# Patient Record
Sex: FEMALE | Race: WHITE
[De-identification: names, ages, dates, MRNs, and addresses within clinical notes are randomized per-mention and may not be internally consistent; named-entity substitution may affect disease eponyms.]

---

## 2020-12-14 ENCOUNTER — NON-APPOINTMENT (OUTPATIENT)
Age: 65
End: 2020-12-14

## 2020-12-14 ENCOUNTER — APPOINTMENT (OUTPATIENT)
Dept: OPHTHALMOLOGY | Facility: CLINIC | Age: 65
End: 2020-12-14
Payer: COMMERCIAL

## 2020-12-14 PROCEDURE — 92025 CPTRIZED CORNEAL TOPOGRAPHY: CPT

## 2020-12-14 PROCEDURE — 92136 OPHTHALMIC BIOMETRY: CPT

## 2020-12-14 PROCEDURE — 92250 FUNDUS PHOTOGRAPHY W/I&R: CPT

## 2020-12-14 PROCEDURE — 99072 ADDL SUPL MATRL&STAF TM PHE: CPT

## 2020-12-14 PROCEDURE — 92004 COMPRE OPH EXAM NEW PT 1/>: CPT

## 2020-12-16 ENCOUNTER — TRANSCRIPTION ENCOUNTER (OUTPATIENT)
Age: 65
End: 2020-12-16

## 2021-06-14 ENCOUNTER — APPOINTMENT (OUTPATIENT)
Dept: OPHTHALMOLOGY | Facility: CLINIC | Age: 66
End: 2021-06-14
Payer: MEDICARE

## 2021-06-14 ENCOUNTER — NON-APPOINTMENT (OUTPATIENT)
Age: 66
End: 2021-06-14

## 2021-06-14 PROCEDURE — 92012 INTRM OPH EXAM EST PATIENT: CPT

## 2021-10-06 ENCOUNTER — APPOINTMENT (OUTPATIENT)
Dept: OPHTHALMOLOGY | Facility: CLINIC | Age: 66
End: 2021-10-06

## 2021-12-06 ENCOUNTER — TRANSCRIPTION ENCOUNTER (OUTPATIENT)
Age: 66
End: 2021-12-06

## 2022-02-19 ENCOUNTER — TRANSCRIPTION ENCOUNTER (OUTPATIENT)
Age: 67
End: 2022-02-19

## 2022-03-07 NOTE — ASU PATIENT PROFILE, ADULT - NSICDXPASTSURGICALHX_GEN_ALL_CORE_FT
PAST SURGICAL HISTORY:  Fracture of right wrist     History of facial surgery     History of repair of left rotator cuff     S/P bilateral breast reduction     S/P gastric bypass     S/P ovarian cystectomy     Status post abdominoplasty     Status post tonsillectomy

## 2022-03-07 NOTE — ASU PATIENT PROFILE, ADULT - ABILITY TO HEAR (WITH HEARING AID OR HEARING APPLIANCE IF NORMALLY USED):
Right ear/Mildly to Moderately Impaired: difficulty hearing in some environments or speaker may need to increase volume or speak distinctly

## 2022-03-07 NOTE — ASU PATIENT PROFILE, ADULT - FALL HARM RISK - UNIVERSAL INTERVENTIONS
Bed in lowest position, wheels locked, appropriate side rails in place/Call bell, personal items and telephone in reach/Instruct patient to call for assistance before getting out of bed or chair/Non-slip footwear when patient is out of bed/[place] to call system/Physically safe environment - no spills, clutter or unnecessary equipment/Purposeful Proactive Rounding/Room/bathroom lighting operational, light cord in reach

## 2022-03-08 ENCOUNTER — NON-APPOINTMENT (OUTPATIENT)
Age: 67
End: 2022-03-08

## 2022-03-08 ENCOUNTER — OUTPATIENT (OUTPATIENT)
Dept: OUTPATIENT SERVICES | Facility: HOSPITAL | Age: 67
LOS: 1 days | Discharge: HOME | End: 2022-03-08
Payer: MEDICARE

## 2022-03-08 ENCOUNTER — APPOINTMENT (OUTPATIENT)
Dept: OPHTHALMOLOGY | Facility: AMBULATORY SURGERY CENTER | Age: 67
End: 2022-03-08

## 2022-03-08 VITALS
DIASTOLIC BLOOD PRESSURE: 49 MMHG | WEIGHT: 155.87 LBS | HEART RATE: 68 BPM | RESPIRATION RATE: 16 BRPM | TEMPERATURE: 98 F | SYSTOLIC BLOOD PRESSURE: 101 MMHG | HEIGHT: 55 IN | OXYGEN SATURATION: 98 %

## 2022-03-08 VITALS
HEART RATE: 84 BPM | DIASTOLIC BLOOD PRESSURE: 58 MMHG | TEMPERATURE: 98 F | SYSTOLIC BLOOD PRESSURE: 112 MMHG | RESPIRATION RATE: 18 BRPM | OXYGEN SATURATION: 98 %

## 2022-03-08 DIAGNOSIS — Z98.890 OTHER SPECIFIED POSTPROCEDURAL STATES: Chronic | ICD-10-CM

## 2022-03-08 DIAGNOSIS — Z90.89 ACQUIRED ABSENCE OF OTHER ORGANS: Chronic | ICD-10-CM

## 2022-03-08 DIAGNOSIS — S62.101A FRACTURE OF UNSPECIFIED CARPAL BONE, RIGHT WRIST, INITIAL ENCOUNTER FOR CLOSED FRACTURE: Chronic | ICD-10-CM

## 2022-03-08 DIAGNOSIS — Z98.84 BARIATRIC SURGERY STATUS: Chronic | ICD-10-CM

## 2022-03-08 PROCEDURE — 6698F: CPT | Mod: RT

## 2022-03-08 PROCEDURE — V2787: CPT

## 2022-03-08 PROCEDURE — 66984 XCAPSL CTRC RMVL W/O ECP: CPT | Mod: RT

## 2022-03-08 DEVICE — IMPLANTABLE DEVICE
Type: IMPLANTABLE DEVICE | Site: RIGHT | Status: NON-FUNCTIONAL
Removed: 2022-03-08

## 2022-03-08 RX ORDER — SODIUM CHLORIDE 9 MG/ML
1000 INJECTION, SOLUTION INTRAVENOUS
Refills: 0 | Status: DISCONTINUED | OUTPATIENT
Start: 2022-03-08 | End: 2022-03-08

## 2022-03-08 RX ORDER — PHENYLEPHRINE HCL 2.5 %
1 DROPS OPHTHALMIC (EYE)
Refills: 0 | Status: COMPLETED | OUTPATIENT
Start: 2022-03-08 | End: 2022-03-08

## 2022-03-08 RX ORDER — ACETAMINOPHEN 500 MG
2 TABLET ORAL
Qty: 0 | Refills: 0 | DISCHARGE
Start: 2022-03-08

## 2022-03-08 RX ORDER — ACETAMINOPHEN 500 MG
650 TABLET ORAL ONCE
Refills: 0 | Status: DISCONTINUED | OUTPATIENT
Start: 2022-03-08 | End: 2022-03-08

## 2022-03-08 RX ORDER — ONDANSETRON 8 MG/1
4 TABLET, FILM COATED ORAL ONCE
Refills: 0 | Status: DISCONTINUED | OUTPATIENT
Start: 2022-03-08 | End: 2022-03-08

## 2022-03-08 RX ORDER — OFLOXACIN 0.3 %
1 DROPS OPHTHALMIC (EYE)
Refills: 0 | Status: COMPLETED | OUTPATIENT
Start: 2022-03-08 | End: 2022-03-08

## 2022-03-08 RX ORDER — TROPICAMIDE 1 %
1 DROPS OPHTHALMIC (EYE)
Refills: 0 | Status: COMPLETED | OUTPATIENT
Start: 2022-03-08 | End: 2022-03-08

## 2022-03-08 RX ADMIN — Medication 1 DROP(S): at 07:11

## 2022-03-08 RX ADMIN — Medication 1 DROP(S): at 07:02

## 2022-03-08 RX ADMIN — Medication 1 DROP(S): at 07:18

## 2022-03-08 RX ADMIN — Medication 1 DROP(S): at 07:01

## 2022-03-08 RX ADMIN — Medication 1 DROP(S): at 07:12

## 2022-03-08 NOTE — OPERATIVE REPORT - OPERATIVE RPOSRT DETAILS
PATIENT: RACHAEL DAS	    ACCOUNT  NUMBER:  [id number]     OPERATING SURGEON:  Dr. Jeferson Zamora	    ASSISTANT SURGEON:  Dr. Arina Simpson    DATE OF SURGERY:  3/8/22    ANESTHESIA:  MAC/TOPICAL	    ESTIMATED BLOOD LOSS: < 1mL	    COMPLICATIONS: None	    PREOPERATIVE DIAGNOSIS:  Cataract,  right eye    POSTOPERATIVE DIAGNOSIS:  Cataract, right  eye    OPERATIVE PROCEDURE:  1. Femtosecond laser assisted laser surgery      right eye.  2. Phacoemulsification with insertion of toric posterior chamber intraocular lens with intraoperative ORA  right eye    LENS IMPLANT: SN6AT3 +15.5    PROCEDURE:	The patient was brought to the laser room where certain aspects of the procedure were performed with the femtosecond laser.     The patient was then brought into the operating room and placed supine on the operating room table. After uneventful induction of neuroleptic anesthesia, lidocaine gel was instilled into the operative eye achieving sufficient anesthesia.  The patient was then prepped and draped in the usual sterile fashion.  A wire lid speculum was then inserted into the operative eye giving a wide palpebral fissure.      	A emmanuel knife was used to perform paracenteses through clear cornea at the 12 and 6 o’clock limbal positions.  The anterior chamber was irrigated with lidocaine 1% nonpreserved followed by epinephrine 0.1% nonpreserved.  Viscoelastic was then used to maintain the anterior chamber.  A keratome was used to create a clear corneal incision just inside the temporal limbus.  Capsulorhexis forceps were used to complete the capsulorhexis. Balanced salt solution was used to hydrodissect the nucleus.  The [employer] phacoemulsification unit was then used to completely phacoemulsify the nucleus, following which an aspirating handpiece was used to aspirate all cortical remnants from the capsular bag.  Viscoelastic was  used to reform the anterior chamber and capsular bag.      	Intraoperateive ORA was used to determine the appropriate lens. A new foldable posterior chamber intraocular lens was brought into the surgical field.  It was folded and directly injected into the capsular bag following which it was centered and secure.  All viscoelastic was then aspirated from the anterior segment using an aspirating handpiece.  All wounds were checked for leaks and there were none.  A solution consisting of 4 mg dexamethasone, 100 mg cefazolin was used to irrigate the surface of the eye.  The lid speculum was removed from the eye and a shield placed over the eye.      	The patient tolerated the procedure well and went to the recovery area in good condition.       PATIENT: RACHAEL DAS	    ACCOUNT  NUMBER:  [id number]     OPERATING SURGEON:  Dr. Jeferson Zamora	    ASSISTANT SURGEON:  Dr. Arina Simpson    DATE OF SURGERY:  3/8/22    ANESTHESIA:  MAC/TOPICAL	    ESTIMATED BLOOD LOSS: < 1mL	    COMPLICATIONS: None	    PREOPERATIVE DIAGNOSIS:  Cataract,  right eye    POSTOPERATIVE DIAGNOSIS:  Cataract, right  eye    OPERATIVE PROCEDURE:  1. Femtosecond laser assisted laser surgery      right eye.  2. Phacoemulsification with insertion of toric posterior chamber intraocular lens with intraoperative ORA  right eye    LENS IMPLANT: SN6AT3 +15.5    PROCEDURE:	The patient was brought to the laser room where certain aspects of the procedure were performed with the femtosecond laser.     The patient was then brought into the operating room and placed supine on the operating room table. After uneventful induction of neuroleptic anesthesia, lidocaine gel was instilled into the operative eye achieving sufficient anesthesia.  The patient was then prepped and draped in the usual sterile fashion.  A wire lid speculum was then inserted into the operative eye giving a wide palpebral fissure.      	A emmanuel knife was used to perform paracenteses through clear cornea at the 12 and 6 o’clock limbal positions.  The anterior chamber was irrigated with lidocaine 1% nonpreserved followed by epinephrine 0.1% nonpreserved.  Viscoelastic was then used to maintain the anterior chamber.  A keratome was used to create a clear corneal incision just inside the temporal limbus.  Capsulorhexis forceps were used to complete the capsulorhexis. Balanced salt solution was used to hydrodissect the nucleus.  The [employer] phacoemulsification unit was then used to completely phacoemulsify the nucleus, following which an aspirating handpiece was used to aspirate all cortical remnants from the capsular bag.  Viscoelastic was  used to reform the anterior chamber and capsular bag.      	Intraoperateive ORA was used to determine the appropriate lens. A new foldable posterior chamber intraocular lens was brought into the surgical field.  It was folded and directly injected into the capsular bag following which ORA was again performed to determine appropriate IOL orientation.  All viscoelastic was then aspirated from the anterior segment using an aspirating handpiece.  All wounds were checked for leaks and there were none.  A solution consisting of 4 mg dexamethasone, 100 mg cefazolin was used to irrigate the surface of the eye.  The lid speculum was removed from the eye and a shield placed over the eye.      	The patient tolerated the procedure well and went to the recovery area in good condition.

## 2022-03-08 NOTE — ASU DISCHARGE PLAN (ADULT/PEDIATRIC) - NS MD DC FALL RISK RISK
For information on Fall & Injury Prevention, visit: [email or web address] OR  [email or web address] OR  https://www.cdc.gov/steadi/patient.html

## 2022-03-08 NOTE — ASU DISCHARGE PLAN (ADULT/PEDIATRIC) - CALL YOUR DOCTOR IF YOU HAVE ANY OF THE FOLLOWING:
Swelling that gets worse/Pain not relieved by Medications/Fever greater than (need to indicate Fahrenheit or Celsius)/Wound/Surgical Site with redness, or foul smelling discharge or pus/Nausea and vomiting that does not stop

## 2022-03-09 ENCOUNTER — NON-APPOINTMENT (OUTPATIENT)
Age: 67
End: 2022-03-09

## 2022-03-09 ENCOUNTER — APPOINTMENT (OUTPATIENT)
Dept: OPHTHALMOLOGY | Facility: CLINIC | Age: 67
End: 2022-03-09
Payer: MEDICARE

## 2022-03-09 PROBLEM — E78.00 PURE HYPERCHOLESTEROLEMIA, UNSPECIFIED: Chronic | Status: ACTIVE | Noted: 2022-03-07

## 2022-03-09 PROCEDURE — 99024 POSTOP FOLLOW-UP VISIT: CPT

## 2022-03-16 ENCOUNTER — APPOINTMENT (OUTPATIENT)
Dept: OPHTHALMOLOGY | Facility: CLINIC | Age: 67
End: 2022-03-16
Payer: MEDICARE

## 2022-03-16 ENCOUNTER — NON-APPOINTMENT (OUTPATIENT)
Age: 67
End: 2022-03-16

## 2022-03-16 PROCEDURE — 99024 POSTOP FOLLOW-UP VISIT: CPT

## 2022-04-11 NOTE — ASU PATIENT PROFILE, ADULT - NSICDXPASTSURGICALHX_GEN_ALL_CORE_FT
PAST SURGICAL HISTORY:  Fracture of right wrist     History of facial surgery     History of repair of left rotator cuff     S/P bilateral breast reduction     S/P cataract extraction, right     S/P gastric bypass     S/P ovarian cystectomy     Status post abdominoplasty     Status post tonsillectomy

## 2022-04-12 ENCOUNTER — OUTPATIENT (OUTPATIENT)
Dept: OUTPATIENT SERVICES | Facility: HOSPITAL | Age: 67
LOS: 1 days | Discharge: HOME | End: 2022-04-12
Payer: MEDICARE

## 2022-04-12 ENCOUNTER — APPOINTMENT (OUTPATIENT)
Dept: OPHTHALMOLOGY | Facility: AMBULATORY SURGERY CENTER | Age: 67
End: 2022-04-12

## 2022-04-12 ENCOUNTER — NON-APPOINTMENT (OUTPATIENT)
Age: 67
End: 2022-04-12

## 2022-04-12 ENCOUNTER — TRANSCRIPTION ENCOUNTER (OUTPATIENT)
Age: 67
End: 2022-04-12

## 2022-04-12 VITALS
DIASTOLIC BLOOD PRESSURE: 53 MMHG | HEART RATE: 78 BPM | OXYGEN SATURATION: 98 % | SYSTOLIC BLOOD PRESSURE: 104 MMHG | WEIGHT: 153.66 LBS | RESPIRATION RATE: 16 BRPM | TEMPERATURE: 99 F | HEIGHT: 68 IN

## 2022-04-12 VITALS
SYSTOLIC BLOOD PRESSURE: 102 MMHG | DIASTOLIC BLOOD PRESSURE: 54 MMHG | TEMPERATURE: 98 F | HEART RATE: 76 BPM | OXYGEN SATURATION: 97 % | RESPIRATION RATE: 16 BRPM

## 2022-04-12 DIAGNOSIS — Z90.89 ACQUIRED ABSENCE OF OTHER ORGANS: Chronic | ICD-10-CM

## 2022-04-12 DIAGNOSIS — Z98.890 OTHER SPECIFIED POSTPROCEDURAL STATES: Chronic | ICD-10-CM

## 2022-04-12 DIAGNOSIS — Z98.84 BARIATRIC SURGERY STATUS: Chronic | ICD-10-CM

## 2022-04-12 DIAGNOSIS — S62.101A FRACTURE OF UNSPECIFIED CARPAL BONE, RIGHT WRIST, INITIAL ENCOUNTER FOR CLOSED FRACTURE: Chronic | ICD-10-CM

## 2022-04-12 DIAGNOSIS — Z98.41 CATARACT EXTRACTION STATUS, RIGHT EYE: Chronic | ICD-10-CM

## 2022-04-12 PROCEDURE — 6698F: CPT | Mod: LT

## 2022-04-12 PROCEDURE — V2787: CPT

## 2022-04-12 PROCEDURE — 66984 XCAPSL CTRC RMVL W/O ECP: CPT | Mod: LT,79

## 2022-04-12 DEVICE — IMPLANTABLE DEVICE
Type: IMPLANTABLE DEVICE | Site: LEFT | Status: NON-FUNCTIONAL
Removed: 2022-04-12

## 2022-04-12 RX ORDER — FAMOTIDINE 10 MG/ML
1 INJECTION INTRAVENOUS
Qty: 0 | Refills: 0 | DISCHARGE

## 2022-04-12 RX ORDER — DIVALPROEX SODIUM 500 MG/1
0 TABLET, DELAYED RELEASE ORAL
Qty: 0 | Refills: 0 | DISCHARGE

## 2022-04-12 RX ORDER — OMEPRAZOLE 10 MG/1
1 CAPSULE, DELAYED RELEASE ORAL
Qty: 0 | Refills: 0 | DISCHARGE

## 2022-04-12 RX ORDER — CYCLOPENTOLATE HYDROCHLORIDE 10 MG/ML
1 SOLUTION/ DROPS OPHTHALMIC
Refills: 0 | Status: DISCONTINUED | OUTPATIENT
Start: 2022-04-12 | End: 2022-04-12

## 2022-04-12 RX ORDER — SERTRALINE 25 MG/1
1 TABLET, FILM COATED ORAL
Qty: 0 | Refills: 0 | DISCHARGE

## 2022-04-12 RX ORDER — OFLOXACIN 0.3 %
1 DROPS OPHTHALMIC (EYE)
Refills: 0 | Status: COMPLETED | OUTPATIENT
Start: 2022-04-12 | End: 2022-04-12

## 2022-04-12 RX ORDER — DIAZEPAM 5 MG
0 TABLET ORAL
Qty: 0 | Refills: 0 | DISCHARGE

## 2022-04-12 RX ORDER — TROPICAMIDE 1 %
1 DROPS OPHTHALMIC (EYE)
Refills: 0 | Status: COMPLETED | OUTPATIENT
Start: 2022-04-12 | End: 2022-04-12

## 2022-04-12 RX ORDER — PHENYLEPHRINE HCL 2.5 %
1 DROPS OPHTHALMIC (EYE)
Refills: 0 | Status: COMPLETED | OUTPATIENT
Start: 2022-04-12 | End: 2022-04-12

## 2022-04-12 RX ORDER — OXYCODONE HYDROCHLORIDE 5 MG/1
5 TABLET ORAL ONCE
Refills: 0 | Status: DISCONTINUED | OUTPATIENT
Start: 2022-04-12 | End: 2022-04-12

## 2022-04-12 RX ORDER — SODIUM CHLORIDE 9 MG/ML
1000 INJECTION, SOLUTION INTRAVENOUS
Refills: 0 | Status: DISCONTINUED | OUTPATIENT
Start: 2022-04-12 | End: 2022-04-12

## 2022-04-12 RX ADMIN — Medication 1 DROP(S): at 08:10

## 2022-04-12 RX ADMIN — Medication 1 DROP(S): at 08:20

## 2022-04-12 RX ADMIN — Medication 1 DROP(S): at 08:15

## 2022-04-12 NOTE — ASU DISCHARGE PLAN (ADULT/PEDIATRIC) - NS MD DC FALL RISK RISK
For information on Fall & Injury Prevention, visit: [email or web address] OR  [email or web address] OR  https://www.cdc.gov/steadi/patient.html

## 2022-04-12 NOTE — OPERATIVE REPORT - OPERATIVE RPOSRT DETAILS
SURGEON: Dr. Jeferson Zamora    ASSISTANT SURGEON:  Dr. Arina Simpson    DATE OF SURGERY: 4/12/22    ANESTHESIA:  MAC/TOPICAL		    COMPLICATIONS:  None	    PREOPERATIVE DIAGNOSIS:  Cataract, Left Eye    POSTOPERATIVE DIAGNOSIS:  Cataract, Left Eye    OPERATIVE PROCEDURE:  1. Femtosecond laser assisted laser surgery        Left Eye  2. Phacoemulsification with insertion of posterior chamber intraocular lens  Left Eye  3. Intraoperative ORA Left Eye    LENS IMPLANT: SN6AT3 +16.0    PROCEDURE:	The patient was brought to the laser room where certain aspects of the procedure were performed with the femtosecond laser.     The patient was then brought into the operating room and placed supine on the operating room table. After uneventful induction of neuroleptic anesthesia, lidocaine gel was instilled into the operative eye achieving sufficient anesthesia.  The patient was then prepped and draped in the usual sterile fashion.  A wire lid speculum was then inserted into the operative eye giving a wide palpebral fissure.      	A emmanuel knife was used to perform paracenteses through clear cornea at the 12 and 6 o’clock limbal positions.  The anterior chamber was irrigated with lidocaine 1% nonpreserved followed by epinephrine 0.1% nonpreserved.  Viscoelastic was then used to maintain the anterior chamber.  A keratome was used to create a clear corneal incision just inside the temporal limbus.  Capsulorhexis forceps were used to complete the capsulorhexis. Balanced salt solution was used to hydrodissect the nucleus.  The [email or web address] phacoemulsification unit was then used to completely phacoemulsify the nucleus, following which an aspirating handpiece was used to aspirate all cortical remnants from the capsular bag.  Viscoelastic was  used to reform the anterior chamber and capsular bag.      	Intraoperative ORA was performed to help determine the appropriate IOL. A new foldable posterior chamber intraocular lens was brought into the surgical field.  It was folded and directly injected into the capsular bag following which ORA was again performed to determine the appropriate orientation of the IOL. All viscoelastic was then aspirated from the anterior segment using an aspirating handpiece.  All wounds were checked for leaks and there were none.  Moxifloxacin 0.3% solution was used to irrigate the surface of the eye.  The lid speculum was removed from the eye and a shield placed over the eye.      	The patient tolerated the procedure well and went to the recovery area in good condition.      					  Jeferson Zamora M.D.

## 2022-04-13 ENCOUNTER — APPOINTMENT (OUTPATIENT)
Dept: OPHTHALMOLOGY | Facility: CLINIC | Age: 67
End: 2022-04-13
Payer: MEDICARE

## 2022-04-13 ENCOUNTER — NON-APPOINTMENT (OUTPATIENT)
Age: 67
End: 2022-04-13

## 2022-04-13 PROCEDURE — 99024 POSTOP FOLLOW-UP VISIT: CPT

## 2022-04-20 ENCOUNTER — APPOINTMENT (OUTPATIENT)
Dept: OPHTHALMOLOGY | Facility: CLINIC | Age: 67
End: 2022-04-20
Payer: MEDICARE

## 2022-04-20 ENCOUNTER — NON-APPOINTMENT (OUTPATIENT)
Age: 67
End: 2022-04-20

## 2022-04-20 PROCEDURE — 99024 POSTOP FOLLOW-UP VISIT: CPT

## 2022-05-26 ENCOUNTER — NON-APPOINTMENT (OUTPATIENT)
Age: 67
End: 2022-05-26

## 2022-05-26 ENCOUNTER — APPOINTMENT (OUTPATIENT)
Dept: OPHTHALMOLOGY | Facility: CLINIC | Age: 67
End: 2022-05-26
Payer: MEDICARE

## 2022-05-26 PROCEDURE — 99024 POSTOP FOLLOW-UP VISIT: CPT

## 2022-07-11 ENCOUNTER — APPOINTMENT (OUTPATIENT)
Dept: OPHTHALMOLOGY | Facility: CLINIC | Age: 67
End: 2022-07-11

## 2022-07-11 ENCOUNTER — NON-APPOINTMENT (OUTPATIENT)
Age: 67
End: 2022-07-11

## 2022-07-11 PROCEDURE — 99024 POSTOP FOLLOW-UP VISIT: CPT

## 2022-08-25 ENCOUNTER — APPOINTMENT (OUTPATIENT)
Dept: ORTHOPEDIC SURGERY | Facility: CLINIC | Age: 67
End: 2022-08-25

## 2022-08-25 PROCEDURE — 20611 DRAIN/INJ JOINT/BURSA W/US: CPT | Mod: 50

## 2022-08-25 PROCEDURE — 99203 OFFICE O/P NEW LOW 30 MIN: CPT | Mod: 25

## 2022-08-25 PROCEDURE — 73562 X-RAY EXAM OF KNEE 3: CPT | Mod: 50

## 2022-08-25 RX ORDER — IBUPROFEN 800 MG/1
800 TABLET ORAL 3 TIMES DAILY
Qty: 90 | Refills: 5 | Status: ACTIVE | COMMUNITY
Start: 2022-08-25 | End: 1900-01-01

## 2022-08-25 RX ORDER — HYALURONATE SODIUM, STABILIZED 88 MG/4 ML
88 SYRINGE (ML) INTRAARTICULAR
Qty: 2 | Refills: 0 | Status: ACTIVE | OUTPATIENT
Start: 2022-08-25

## 2022-08-25 NOTE — HISTORY OF PRESENT ILLNESS
[de-identified] : BILATERAL KNEE PAIN\par STARTED 2 YEARS AGO- NO SPECIFIC INJURY \par INTERMITTENT \par WORSE WHEN GOING UP/DOWN STAIRS, BENDING \par BETTER WITH IBUPROFEN, REST \par PAIN LEVEL: 7/10\par ACHY

## 2022-08-25 NOTE — PHYSICAL EXAM
[de-identified] : PHYSICAL EXAM BILATERAL KNEES\par \par AROM\par RIGHT  0- 140\par LEFT    0-140 \par \par SPECIAL TESTS\par \par PATELLAR GRIND = GRIND \par DRAWER  = NEG\par LACHMAN = NEG\par MACMURRAY = NEG \par \par MOTOR = GROSSLY INTACT\par SENSORY = GROSSLY INTACT \par \par \par  [de-identified] : XRAY RIGHT KNEE:\par 4 views of the knee\par GRADE 2-3 OA PF JOINT \par No obvious fracture or dislocation. \par Alignment within normal limits\par \par \par XRAY LEFT KNEE:\par 4 views of the knee\par GRADE 2-3 OA PF JOINT \par No obvious fracture or dislocation. \par Alignment within normal limits\par \par \par

## 2022-08-25 NOTE — DISCUSSION/SUMMARY
[de-identified] : XRAYS REVEAL KNEE OSTEOARTHRITIS\par LITTLE RELIEF FROM NSAIDS , EXERCISES \par PATIENT HAS ELECTED TO PROCEED WITH KENALOG INJECTION KNEE  \par RISKS AND BENEFITS DISCUSSED - VERBAL CONSENT OBTAINED \par SEE PROCEDURE NOTE\par \par \par POST INJECTION INSTRUCTIONS:\par \par INJECTION THERAPY HANDOUT PROVIDED\par \par COLD THERAPY , ANALGESICS PRN\par \par HOME STRETCHING AND EXERCISES QD  -  HANDOUT PROVIDED, REVIEWED AND DEMONSTRATED \par \par START PHYSICAL THERAPY 2 X 4-6 WEEKS\par PROGRESS TO HOME EXERCISES\par

## 2022-08-25 NOTE — PROCEDURE
[de-identified] : INJECTION / ASPIRATION BILATERAL KNEES\par \par Patient has demonstrated limited relief from NSAIDS, rest, exercises / PT , and after discussion of the risks and benefits, the patient has elected to proceed with a\par n ULTRASOUND GUIDED corticosteroid injection into the BILATERAL SupeLat PF Knee\par  \par Confirmed that the patient does not have history of prior adverse reactions, active, infections, or relevant allergies. There was no effusion, erythema, or warmth, and the skin was clear\par \par The skin was sterilized with alcohol. Ethyl Chloride was used as a topical anesthetic. Routine sterile technique. \par  \par The KNEE JOINT  was injected utilizing ULTRASOUND GUIDANCEwith KENALOG + LIDOCAINE. The injection was completed without complication and a bandage was applied.\par \par The patient tolerated the procedure well and was given post-injection instructions.Rec: Cold therapy, analgesics, avoid heavy activity.\par \par MEDICATION: Lidocaine 1% 4cc + 10mg of Kenalog\par

## 2022-08-31 ENCOUNTER — NON-APPOINTMENT (OUTPATIENT)
Age: 67
End: 2022-08-31

## 2022-08-31 ENCOUNTER — APPOINTMENT (OUTPATIENT)
Dept: OPHTHALMOLOGY | Facility: CLINIC | Age: 67
End: 2022-08-31

## 2022-08-31 PROCEDURE — 66821 AFTER CATARACT LASER SURGERY: CPT | Mod: LT

## 2022-10-05 ENCOUNTER — NON-APPOINTMENT (OUTPATIENT)
Age: 67
End: 2022-10-05

## 2022-10-05 ENCOUNTER — APPOINTMENT (OUTPATIENT)
Dept: OPHTHALMOLOGY | Facility: CLINIC | Age: 67
End: 2022-10-05

## 2022-10-05 PROCEDURE — 66821 AFTER CATARACT LASER SURGERY: CPT | Mod: 78,RT

## 2022-10-06 ENCOUNTER — APPOINTMENT (OUTPATIENT)
Dept: ORTHOPEDIC SURGERY | Facility: CLINIC | Age: 67
End: 2022-10-06

## 2022-10-06 PROCEDURE — 99213 OFFICE O/P EST LOW 20 MIN: CPT | Mod: 25

## 2022-10-06 PROCEDURE — 20611 DRAIN/INJ JOINT/BURSA W/US: CPT | Mod: 50

## 2022-10-06 NOTE — PROCEDURE
[de-identified] : Patient has demonstrated limited relief from NSAIDS, rest, exercises / PT , and after discussion of the risks and benefits, the patient has elected to proceed with a\par n ULTRASOUND GUIDED VISCOSUPPLEMENT injection into the BILATERAL SupeLat PF Knee\par  \par Confirmed that the patient does not have history of prior adverse reactions, active, infections, or relevant allergies. There was no effusion, erythema, or warmth, and the skin was clear\par \par The skin was sterilized with alcohol. Ethyl Chloride was used as a topical anesthetic. Routine sterile technique. \par  \par The KNEE JOINT  was injected utilizing ULTRASOUND GUIDANCE with MONOVISC 4CC. The injection was completed without complication and a bandage was applied.\par \par The patient tolerated the procedure well and was given post-injection instructions.Rec: Cold therapy, analgesics, avoid heavy activity.\par \par MEDICATION: MONOVISC 4CC\par \par EFFUSION ASPIRATED  : NONE \par

## 2022-10-06 NOTE — HISTORY OF PRESENT ILLNESS
[de-identified] : BILATERAL KNEE PAIN \par OSTEOARTHRITIS \par FOLLOW UP\par PAIN LEVEL: 2/10\par MONOVISC INJECTION TODAY- PROVIDED BY HOUSE SUPPLY \par \par \par PREVIOUS HPI\par BILATERAL KNEE PAIN\par STARTED 2 YEARS AGO- NO SPECIFIC INJURY \par INTERMITTENT \par WORSE WHEN GOING UP/DOWN STAIRS, BENDING \par BETTER WITH IBUPROFEN, REST \par PAIN LEVEL: 7/10\par ACHY \par \par 
denies pain/discomfort

## 2022-10-06 NOTE — DISCUSSION/SUMMARY
[de-identified] : XRAYS REVEAL KNEE OSTEOARTHRITIS\par LITTLE RELIEF FROM NSAIDS , EXERCISES \par PATIENT HAS ELECTED TO PROCEED WITH VISCOSUPPLEMENT  INJECTION KNEE  \par RISKS AND BENEFITS DISCUSSED - VERBAL CONSENT OBTAINED \par SEE PROCEDURE NOTE\par \par \par POST INJECTION INSTRUCTIONS:\par \par INJECTION THERAPY HANDOUT PROVIDED\par \par COLD THERAPY , ANALGESICS PRN\par \par HOME STRETCHING AND EXERCISES QD  -  KNEE OA  HANDOUT PROVIDED, REVIEWED AND DEMONSTRATED \par \par REPEAT MONOVISC 6 MONTHS \par \par \par

## 2022-10-06 NOTE — PHYSICAL EXAM
[de-identified] : PHYSICAL EXAM BILATERAL KNEES\par \par AROM\par RIGHT  0- 140\par LEFT    0-140 \par \par SPECIAL TESTS\par \par PATELLAR GRIND = GRIND \par DRAWER  = NEG\par LACHMAN = NEG\par MACMURRAY = NEG \par \par MOTOR = GROSSLY INTACT\par SENSORY = GROSSLY INTACT \par \par \par

## 2022-10-12 ENCOUNTER — APPOINTMENT (OUTPATIENT)
Dept: ORTHOPEDIC SURGERY | Facility: CLINIC | Age: 67
End: 2022-10-12

## 2022-10-12 VITALS — HEIGHT: 68 IN | BODY MASS INDEX: 23.49 KG/M2 | WEIGHT: 155 LBS

## 2022-10-12 PROCEDURE — 72110 X-RAY EXAM L-2 SPINE 4/>VWS: CPT

## 2022-10-12 PROCEDURE — 99214 OFFICE O/P EST MOD 30 MIN: CPT

## 2022-10-12 RX ORDER — CYCLOBENZAPRINE HYDROCHLORIDE 5 MG/1
5 TABLET, FILM COATED ORAL 3 TIMES DAILY
Qty: 21 | Refills: 1 | Status: ACTIVE | COMMUNITY
Start: 2022-10-12 | End: 1900-01-01

## 2022-11-02 ENCOUNTER — APPOINTMENT (OUTPATIENT)
Dept: OPHTHALMOLOGY | Facility: CLINIC | Age: 67
End: 2022-11-02

## 2022-11-02 ENCOUNTER — NON-APPOINTMENT (OUTPATIENT)
Age: 67
End: 2022-11-02

## 2022-11-02 PROCEDURE — 99024 POSTOP FOLLOW-UP VISIT: CPT

## 2022-11-28 NOTE — HISTORY OF PRESENT ILLNESS
[de-identified] : 67 year old female presents with acute exacerbation of chronic low back pain.  She denies injury.  She denies radicular pain, recent illness, fevers, numbness, weakness, balance problems, saddle anesthesia, urinary retention or fecal incontinence. Activity makes it worse.  She takes ibuprofen with minimal relief.

## 2022-11-28 NOTE — ASSESSMENT
[FreeTextEntry1] : 67 year old female presents with acute exacerbation of chronic low back pain.  She denies injury.  She denies radicular pain.  She is neurologically intact.  The patient was given a referral for physical therapy.  She was given a prescription for cyclobenzaprine. She will followup in 4-6 weeks. We discussed red flag symptoms that would require emergent evaluation. She knows to call with any questions or concerns or if her symptoms acutely worsen.

## 2022-11-28 NOTE — PHYSICAL EXAM
[de-identified] : General: No acute distress, conversant, well-nourished.\par Head: Normocephalic, atraumatic\par Neck: trachea midline, FROM\par Heart: normotensive and normal rate and rhythm\par Lungs: No labored breathing\par Skin: No abrasions, no rashes, no edema\par Psych: Alert and oriented to person, place and time\par Extremities: no peripheral edema or digital cyanosis\par Gait: Normal gait. Can perform tandem gait.  \par Vascular: warm and well perfused distally, palpable distal pulses\par \par MSK:\par Lumbar spine:\par No tenderness to palpation.  No step-off, no deformity.\par \par NEURO EXAM:\par Sensation \par Left L2  -  2/2            \par Left L3  -  2/2\par Left L4  -  2/2\par Left L5  -  2/2\par Left S1  -  2/2\par \par Right L2  -  2/2            \par Right L3  -  2/2\par Right L4  -  2/2\par Right L5  -  2/2\par Right S1  -  2/2\par \par Motor: \par Left L2 (hip flexion)                            5/5                \par Left L3 (knee extension)                   5/5                \par Left L4 (ankle dorsiflexion)                 5/5                \par Left L5 (long toe extensor)                5/5                \par Left S1 (ankle plantar flexion)           5/5\par \par Right L2 (hip flexion)                            5/5                \par Right L3 (knee extension)                   5/5                \par Right L4 (ankle dorsiflexion)                 5/5                \par Right L5 (long toe extensor)                5/5                \par Right S1 (ankle plantar flexion)           5/5\par \par Reflexes: Normal and symmetric\par Negative clonus.  Down-going Babinski.   [de-identified] : I ordered radiographs to evaluate the patient's symptoms.\par \par Lumbar 4 view radiographs taken in the office today show no dislocation or fracture.  Lumbar spondylosis. Thoracolumbar scoliosis.  No instability on dynamic series.

## 2022-11-30 ENCOUNTER — APPOINTMENT (OUTPATIENT)
Dept: ORTHOPEDIC SURGERY | Facility: CLINIC | Age: 67
End: 2022-11-30

## 2022-11-30 PROCEDURE — 99214 OFFICE O/P EST MOD 30 MIN: CPT

## 2022-11-30 RX ORDER — CYCLOBENZAPRINE HYDROCHLORIDE 5 MG/1
5 TABLET, FILM COATED ORAL 3 TIMES DAILY
Qty: 42 | Refills: 3 | Status: ACTIVE | COMMUNITY
Start: 2022-11-30 | End: 1900-01-01

## 2022-12-05 NOTE — PHYSICAL EXAM
[de-identified] : General: No acute distress, conversant, well-nourished.\par Head: Normocephalic, atraumatic\par Neck: trachea midline, FROM\par Heart: normotensive and normal rate and rhythm\par Lungs: No labored breathing\par Skin: No abrasions, no rashes, no edema\par Psych: Alert and oriented to person, place and time\par Extremities: no peripheral edema or digital cyanosis\par Gait: Normal gait. Can perform tandem gait.  \par Vascular: warm and well perfused distally, palpable distal pulses\par \par MSK:\par Lumbar spine:\par No tenderness to palpation.  No step-off, no deformity.\par \par NEURO EXAM:\par Sensation \par Left L2  -  2/2            \par Left L3  -  2/2\par Left L4  -  2/2\par Left L5  -  2/2\par Left S1  -  2/2\par \par Right L2  -  2/2            \par Right L3  -  2/2\par Right L4  -  2/2\par Right L5  -  2/2\par Right S1  -  2/2\par \par Motor: \par Left L2 (hip flexion)                            5/5                \par Left L3 (knee extension)                   5/5                \par Left L4 (ankle dorsiflexion)                 5/5                \par Left L5 (long toe extensor)                5/5                \par Left S1 (ankle plantar flexion)           5/5\par \par Right L2 (hip flexion)                            5/5                \par Right L3 (knee extension)                   5/5                \par Right L4 (ankle dorsiflexion)                 5/5                \par Right L5 (long toe extensor)                5/5                \par Right S1 (ankle plantar flexion)           5/5\par \par Reflexes: Normal and symmetric\par Negative clonus.  Down-going Babinski.   [de-identified] : Lumbar 4 view radiographs (10/12/22) no dislocation or fracture.  Lumbar spondylosis. Thoracolumbar scoliosis.  No instability on dynamic series.

## 2022-12-05 NOTE — HISTORY OF PRESENT ILLNESS
[de-identified] : 67 year old female followup with acute exacerbation of chronic low back pain.  She denies injury.  She denies radicular pain, recent illness, fevers, numbness, weakness, balance problems, saddle anesthesia, urinary retention or fecal incontinence. Since her last appointment she has been doing PT with some improvement.  She takes cyclobenzaprine which helps.

## 2022-12-05 NOTE — ASSESSMENT
[FreeTextEntry1] : 67 year old female followup with acute exacerbation of chronic low back pain.  She denies injury.  She denies radicular pain. She is neurologically intact. Since her last appointment she has been doing PT with some improvement. She will continue PT.  She will be sent for a lumbar MRI.  She will continue cyclobenzaprine. She will followup once her MRI is completed. We discussed red flag symptoms that would require emergent evaluation. She knows to call with any questions or concerns or if her symptoms acutely worsen.

## 2022-12-15 ENCOUNTER — APPOINTMENT (OUTPATIENT)
Dept: ORTHOPEDIC SURGERY | Facility: CLINIC | Age: 67
End: 2022-12-15
Payer: MEDICARE

## 2022-12-15 PROCEDURE — 99214 OFFICE O/P EST MOD 30 MIN: CPT

## 2022-12-15 RX ORDER — TIZANIDINE 2 MG/1
2 TABLET ORAL
Qty: 40 | Refills: 2 | Status: ACTIVE | COMMUNITY
Start: 2022-12-15 | End: 1900-01-01

## 2022-12-22 ENCOUNTER — APPOINTMENT (OUTPATIENT)
Dept: ORTHOPEDIC SURGERY | Facility: CLINIC | Age: 67
End: 2022-12-22

## 2022-12-22 ENCOUNTER — APPOINTMENT (OUTPATIENT)
Dept: PHYSICAL MEDICINE AND REHAB | Facility: CLINIC | Age: 67
End: 2022-12-22

## 2022-12-22 PROCEDURE — 99203 OFFICE O/P NEW LOW 30 MIN: CPT | Mod: 25

## 2022-12-22 PROCEDURE — 99214 OFFICE O/P EST MOD 30 MIN: CPT

## 2022-12-22 PROCEDURE — 20552 NJX 1/MLT TRIGGER POINT 1/2: CPT

## 2022-12-22 PROCEDURE — 73030 X-RAY EXAM OF SHOULDER: CPT | Mod: RT

## 2022-12-22 NOTE — HISTORY OF PRESENT ILLNESS
[FreeTextEntry1] : Location: left back\par Severity: 6/10\par Duration: years\par Aggravating Factors: most of the time\par Alleviating Factors: sitting on couch\par Associated Symptoms: denies weight loss, fever, chills, change in bowel/bladder habits, weakness, numbness/tingling, radiation down legs\par Prior Studies: MRI

## 2022-12-22 NOTE — PHYSICAL EXAM
[de-identified] : PHYSICAL EXAM  RIGHT  SHOULDER\par \par MODERATE  SCAPULAR PROTRACTION\par AROM 120 / 130 / 80 / 20 \par TENDER: SA REGION ANTERIOR AND LATERAL\par \par SPECIAL TESTING :\par ARROYO - POSITIVE \par MILE - POSITIVE \par SPEED TEST - POSITIVE\par \par YEBOAH - NEGATIVE \par APPREHENSION AND SUPPRESSION - NEGATIVE \par \par RC STRENGTH TESTING \par SS:  5/5\par SUB 5/5\par IS     5/5\par BICEPS  5/5\par \par SENSATION  - GROSSLY INTACT\par \par \par  [de-identified] : RIGHT SHOULDER XRAY (2 VIEWS - AP AND OUTLET) -  \par NO OBVIOUS FRACTURE ,  SEPARATION OR DISLOCATION \par NO SIGNIFICANT OSTEOARTHRITIS,\par TYPE 3B ACROMION \par CSA=33.1\par

## 2022-12-22 NOTE — DISCUSSION/SUMMARY
[de-identified] : RIGHT SHOULDER INJURY \par PAIN STARTED THIS SUMMER \par NOW MUCH WORSE - PAIN WITH USE AND SLEEPING ON IT \par \par MRI RIGHT SHOULDER

## 2022-12-22 NOTE — ASSESSMENT
[FreeTextEntry1] : MRI reviewed showing L2-3 disc bulge with L3 abutment, L3-4 ligamentum flavum hypertrophy with L4 abutment, L4/5 disc bulge with L5 abutment, L5/S1 disc bulge with S1 abutment, diffuse facet arthritis.\par \par Discussed diagnosis and treatment plan including PT.\par Reviewed exercises to do. Start with knee pushup.\par Avoid back flexion.  Limit sitting.  Ice back often.  Lift things properly.  Massage back with tennis ball.\par Consider left facet injection\par \par She had some immediate relief. \par f/u 2 wk

## 2022-12-22 NOTE — PHYSICAL EXAM
[FreeTextEntry1] : RACHAEL is a 67 year old female \par Constitutional: healthy appearing, NAD, and normal body habitus\par \par LUMBAR\par ROM: flexion to 30 deg, ext to 5 deg\par \par Gait: normal\par \par Inspection: no erythema, warmth\par Spine: no TTP in spinous process\par Bony palpation: no TTP in GT\par \par Soft tissue palpation hip: no TTP in gluteus maame\par Soft tissue palpation of spine: left TTP in lumbar paraspinals\par \par 5/5 bilateral KE, DF, PF \par sensation intact in bilat LE\par \par \par Special tests: neg seated SLR\par \par

## 2022-12-22 NOTE — HISTORY OF PRESENT ILLNESS
[de-identified] : LOCATION:RIGHT SHOULDER PAIN- [facility] \par DURATION: 4-5 MONTHS AGO \par CONTEXT: ATRAUMATIC- NO SPECIFIC INJURY \par QUALITY:SHARP, ACHY \par  INTERMITTENT \par PAIN LEVEL: 7-8/10 \par BETTER WITH RESTING, IBUPROFEN \par WORSE WITH REACHING BEHIND, AT NIGHT, OVER HEAD LIFTING \par \par LEFT SHOULDER- ROT CUFF SURGERY- 2009\par

## 2022-12-22 NOTE — PROCEDURE
[de-identified] : Indication: myofascial pain\par \par After informed consent,she elected to proceed with a trigger point injection into the left L2 and L3 paraspinals. I confirmed no prior adverse reactions, no active infections, and no relevant allergies. \par  \par The skin was prepped in the usual sterile manner.  The sites were injected with Lidocaine followed by local needling. The injection was completed without complication and a bandage was applied. She tolerated the procedure well and was given post-injection instructions. \par  \par Cold Tx x 48 hours, analgesics prn. Medications: 0.5 ml of 1% Lidocaine per site\par \par Exp 5/2023\par Manufacture: Hikma\par NDC [id number]\par [id number]\par

## 2023-01-05 ENCOUNTER — APPOINTMENT (OUTPATIENT)
Dept: PHYSICAL MEDICINE AND REHAB | Facility: CLINIC | Age: 68
End: 2023-01-05
Payer: MEDICARE

## 2023-01-05 PROCEDURE — 99213 OFFICE O/P EST LOW 20 MIN: CPT

## 2023-01-05 NOTE — ASSESSMENT
[FreeTextEntry1] : MRI reviewed showing L2-3 disc bulge with L3 abutment, L3-4 ligamentum flavum hypertrophy with L4 abutment, L4/5 disc bulge with L5 abutment, L5/S1 disc bulge with S1 abutment, diffuse facet arthritis.\par \par Discussed diagnosis and treatment plan including PT.\par Discussed facet injections in detail.  She elects to do it.  Left L4/5 facet at -5 deg, L5/S1 at 50 deg. \par Check with gastric surgeon if she can take nsaids.

## 2023-01-05 NOTE — HISTORY OF PRESENT ILLNESS
[FreeTextEntry1] : Location: left back\par Severity: 7/10\par Duration: years\par Aggravating Factors: most of the time\par Alleviating Factors: sitting on couch\par Associated Symptoms: denies weight loss, fever, chills, change in bowel/bladder habits, weakness, numbness/tingling, radiation down legs\par Prior Studies: MRI \par

## 2023-01-05 NOTE — PHYSICAL EXAM
[FreeTextEntry1] : RACHAEL is a 67 year old female \par Constitutional: healthy appearing, NAD, and normal body habitus\par \par LUMBAR\par ROM: flexion to 30 deg, ext to 5 deg\par \par Gait: normal\par \par Inspection: no erythema, warmth\par Spine: no TTP in spinous process\par Bony palpation: no TTP in GT\par \par Soft tissue palpation hip: no TTP in gluteus maame\par Soft tissue palpation of spine: left TTP in lumbar paraspinals\par \par 5/5 bilateral KE, DF, PF \par sensation intact in bilat LE\par

## 2023-01-17 ENCOUNTER — APPOINTMENT (OUTPATIENT)
Dept: PHYSICAL MEDICINE AND REHAB | Facility: CLINIC | Age: 68
End: 2023-01-17
Payer: MEDICARE

## 2023-01-17 PROCEDURE — 64494 INJ PARAVERT F JNT L/S 2 LEV: CPT | Mod: LT

## 2023-01-17 PROCEDURE — 64493 INJ PARAVERT F JNT L/S 1 LEV: CPT | Mod: LT

## 2023-01-17 NOTE — PROCEDURE
[de-identified] : indication: pain\par \par Fluoroscopically Guided, Contrast Enhanced,  Left L4/5 and L5/S1 Facet Injections\par \par After informed consent, she was placed prone on the fluoroscopy table. Skin over the area was prepped and draped in the usual sterile fashion before being anesthetized with 1% Lidocaine. Then using an oblique approach, a 3 ½ in spinal needle was directed down to the L5/S1 facet joint. Needle placement was confirmed with AP fluoroscopic images. After negative aspiration for blood or cerebrospinal fluid, contrast was instilled under live fluoroscopy and an arthrogram was obtained. It showed good flow in the capsule without any vascular uptake. Then a steroid solution consisting of 20 mg of Kenalog and 1 ml of 1% Lidocaine was instilled. The same procedure was repeated at the    L4/5 facet joint. \par \par She tolerated the procedure well and was discharged in good condition without any complications or complaints.  She  was given discharge instructions and asked to follow-up in clinic in two weeks.\par \par Lidocaine\par Exp 5/2023\par Manufacture: Hikma\par NDC [id number]\par [id number]\par \par Manufacture GE\par Omnipaque 240\par NDC [id number]\par Exp 5/4/24\par [id number]\par \par Manufacture [employer]\par Kenalog 40 \par NDC [id number]\par Exp 2/23\par LOT [id number]\par

## 2023-01-18 ENCOUNTER — APPOINTMENT (OUTPATIENT)
Dept: ORTHOPEDIC SURGERY | Facility: CLINIC | Age: 68
End: 2023-01-18
Payer: MEDICARE

## 2023-01-18 DIAGNOSIS — M75.41 IMPINGEMENT SYNDROME OF RIGHT SHOULDER: ICD-10-CM

## 2023-01-18 PROCEDURE — 99213 OFFICE O/P EST LOW 20 MIN: CPT

## 2023-01-18 NOTE — DISCUSSION/SUMMARY
[de-identified] : PREOP SHOULDER SURGERY DISCUSSION:\par \par PROCEDURE DISCUSSED - QUESTIONS ANSWERED\par PATIENT WISHES TO PROCEED\par \par POST OP CARE AND LIMITATIONS REVIEWED - HANDOUT PROVIDED \par \par COLD PACKS RECOMMENDED\par ANALGESICS AND  ANTI NAUSEA MEDS PRESCRIBED \par COLACE RECOMMENDED\par \par \par THERE ARE NO GUARANTEES THAT ALL SYMPTOMS WILL BE ALLEVIATED  \par SHOULDER ARTHROSCOPY, ACROMIOPLASTY, DEBRIDEMENT,  RC REPAIR AND LABRUM REPAIRS- ON AVERAGE 75- 85% SATISFACTORY RESULTS FOR TEARS < 3CM AFTER 9-12 MONTHS HEALING AND REHABILITATION. \par \par REPAIRS WILL REQUIRE STRICT SHOULDER IMMOBILIZER 4-6 WEEKS\par \par RC TEARS 3CM OR LARGER MAY REQUIRE COLLAGEN PATCH AUGMENTATION GENERALLY HAVE LESS SATISFACTORY RESULTS\par \par PHYSICAL THERAPY REQUIRED 2X WEEK FOR  MINIMUM 8-12 WEEKS FOR ALL PROCEDURES \par CONTINUED HOME EXERCISES 6-9 MONTHS AFTER THAT REQUIRED FOR OPTIMAL OUTCOMES \par \par ROUTINE SURGICAL AND ANESTHETIC RISKS INCLUDE RISK OF SURGICAL INFECTION, ANESTHETIC COMPLICATION OR ALLERGY, POSSIBLE RETEARS OR PROGRESSION OF TEAR, STIFFNESS OF SHOULDER AND UNSATISFACTORY OUTCOMES\par \par PATIENT UNDERSTANDS AND WISHES TO PROCEED\par

## 2023-01-18 NOTE — PHYSICAL EXAM
[de-identified] : PHYSICAL EXAM  RIGHT  SHOULDER\par \par MODERATE  SCAPULAR PROTRACTION\par AROM 120 / 130 / 80 / 20 \par TENDER: SA REGION ANTERIOR AND LATERAL\par \par SPECIAL TESTING :\par ARROYO - POSITIVE \par MILE - POSITIVE \par SPEED TEST - POSITIVE\par \par YEBOAH - NEGATIVE \par APPREHENSION AND SUPPRESSION - NEGATIVE \par \par RC STRENGTH TESTING \par SS:  5/5\par SUB 5/5\par IS     5/5\par BICEPS  5/5\par \par SENSATION  - GROSSLY INTACT\par \par \par  [de-identified] : Date of Exam: 01-\par \par EXAM:  MRI RIGHT SHOULDER WITHOUT CONTRAST\par IMPRESSION: \par 1. Mild AC joint arthrosis.\par 2. Anteriorly downwardly hooked acromial shape, small, broad-based subacromial enthesophyte further accentuating the acromial rim and undersurface.\par 3. Mild subacromial/subcutaneous subdeltoid bursitis.\par 4. Study negative for full-thickness rotator cuff tear. There is marked fraying of the bursal surface of the supraspinatus tendon, with small bursal sided partial thickness tear at the tendon's attachment. Please see further description above. There is no fatty atrophy in the supraspinatus muscle.\par 5. Small type IIb SLAP tear.\par 6. Mild glenohumeral arthrosis.\par

## 2023-01-18 NOTE — HISTORY OF PRESENT ILLNESS
[de-identified] : LOCATION:RIGHT SHOULDER PAIN- [facility] \par FOLLOW UP \par PAIN LEVEL:  7/10 \par MRI RESULTS TODAY- RIGHT SHOULDER \par \par \par \par \par LOCATION:RIGHT SHOULDER PAIN- [facility] \par DURATION: 4-5 MONTHS AGO \par CONTEXT: ATRAUMATIC- NO SPECIFIC INJURY \par QUALITY:SHARP, ACHY \par  INTERMITTENT \par PAIN LEVEL: 7-8/10 \par BETTER WITH RESTING, IBUPROFEN \par WORSE WITH REACHING BEHIND, AT NIGHT, OVER HEAD LIFTING \par \par LEFT SHOULDER- ROT CUFF SURGERY- 2009\par

## 2023-01-26 ENCOUNTER — APPOINTMENT (OUTPATIENT)
Dept: ORTHOPEDIC SURGERY | Facility: CLINIC | Age: 68
End: 2023-01-26
Payer: MEDICARE

## 2023-01-26 DIAGNOSIS — M79.18 MYALGIA, OTHER SITE: ICD-10-CM

## 2023-01-26 PROCEDURE — 99214 OFFICE O/P EST MOD 30 MIN: CPT

## 2023-02-04 PROBLEM — M79.18 MYOFASCIAL PAIN: Status: ACTIVE | Noted: 2022-12-22

## 2023-02-04 NOTE — PHYSICAL EXAM
[de-identified] : General: No acute distress, conversant, well-nourished.\par Head: Normocephalic, atraumatic\par Neck: trachea midline, FROM\par Heart: normotensive and normal rate and rhythm\par Lungs: No labored breathing\par Skin: No abrasions, no rashes, no edema\par Psych: Alert and oriented to person, place and time\par Extremities: no peripheral edema or digital cyanosis\par Gait: Normal gait. Can perform tandem gait.  \par Vascular: warm and well perfused distally, palpable distal pulses\par \par MSK:\par Lumbar spine:\par No tenderness to palpation.  No step-off, no deformity.\par \par NEURO EXAM:\par Sensation \par Left L2  -  2/2            \par Left L3  -  2/2\par Left L4  -  2/2\par Left L5  -  2/2\par Left S1  -  2/2\par \par Right L2  -  2/2            \par Right L3  -  2/2\par Right L4  -  2/2\par Right L5  -  2/2\par Right S1  -  2/2\par \par Motor: \par Left L2 (hip flexion)                            5/5                \par Left L3 (knee extension)                   5/5                \par Left L4 (ankle dorsiflexion)                 5/5                \par Left L5 (long toe extensor)                5/5                \par Left S1 (ankle plantar flexion)           5/5\par \par Right L2 (hip flexion)                            5/5                \par Right L3 (knee extension)                   5/5                \par Right L4 (ankle dorsiflexion)                 5/5                \par Right L5 (long toe extensor)                5/5                \par Right S1 (ankle plantar flexion)           5/5\par \par Reflexes: Normal and symmetric\par Negative clonus.  Down-going Babinski.   [de-identified] : MRI of the lumbar spine demonstrates: (12/08/22) \par 1.  Grade 1 anterolisthesis L1-L2.\par 2.  Circumferential disc bulging and disc osteophyte at T12-L1 causing mild spinal canal stenosis.\par 3.  Multilevel disc and facet joint disease.\par \par L1-L2\par Disc bulging with central posterior disc protrusion. Mild thecal sac compression. Bilateral intrathecal L2 nerve root abutment.\par \par L2-L3\par Mild disc bulging. Mild thecal sac compression. Bilateral intrathecal L3 nerve root abutment.\par \par L3-L4\par Circumferential disc bulging with disc osteophyte. Moderate bilateral facet joint and ligamentum flavum hypertrophy. Mild thecal sac compression. Bilateral intrathecal L4 nerve root abutment.\par \par L4-L5\par Circumferential disc bulging. Severe bilateral facet joint arthrosis and hypertrophy. Mild thecal sac compression with mild left intrathecal L5 nerve root posterior displacement and right intrathecal L5 nerve root abutment.\par \par L5-S1\par Circumferential disc bulging worse on the left. Severe right and mild to moderate left facet joint arthrosis. Mild thecal sac compression. Abutment of the left intrathecal S1 nerve root. Mild to moderate left foraminal stenosis.\par

## 2023-02-04 NOTE — ASSESSMENT
[FreeTextEntry1] : 67 year old female followup with acute exacerbation of chronic low back pain.  She denies injury.  She denies radicular pain. She is neurologically intact. Since her last visit she had a lumbar epidural with good relief. She will continue cyclobenzaprine. She will continue PT.  She can consider additional spinal injections.  She will followup in 2-3 months. We discussed red flag symptoms that would require emergent evaluation. She knows to call with any questions or concerns or if her symptoms acutely worsen.

## 2023-02-04 NOTE — PHYSICAL EXAM
[de-identified] : General: No acute distress, conversant, well-nourished.\par Head: Normocephalic, atraumatic\par Neck: trachea midline, FROM\par Heart: normotensive and normal rate and rhythm\par Lungs: No labored breathing\par Skin: No abrasions, no rashes, no edema\par Psych: Alert and oriented to person, place and time\par Extremities: no peripheral edema or digital cyanosis\par Gait: Normal gait. Can perform tandem gait.  \par Vascular: warm and well perfused distally, palpable distal pulses\par \par MSK:\par Lumbar spine:\par No tenderness to palpation.  No step-off, no deformity.\par \par NEURO EXAM:\par Sensation \par Left L2  -  2/2            \par Left L3  -  2/2\par Left L4  -  2/2\par Left L5  -  2/2\par Left S1  -  2/2\par \par Right L2  -  2/2            \par Right L3  -  2/2\par Right L4  -  2/2\par Right L5  -  2/2\par Right S1  -  2/2\par \par Motor: \par Left L2 (hip flexion)                            5/5                \par Left L3 (knee extension)                   5/5                \par Left L4 (ankle dorsiflexion)                 5/5                \par Left L5 (long toe extensor)                5/5                \par Left S1 (ankle plantar flexion)           5/5\par \par Right L2 (hip flexion)                            5/5                \par Right L3 (knee extension)                   5/5                \par Right L4 (ankle dorsiflexion)                 5/5                \par Right L5 (long toe extensor)                5/5                \par Right S1 (ankle plantar flexion)           5/5\par \par Reflexes: Normal and symmetric\par Negative clonus.  Down-going Babinski.   [de-identified] : MRI of the lumbar spine demonstrates: (12/08/22) \par 1.  Grade 1 anterolisthesis L1-L2.\par 2.  Circumferential disc bulging and disc osteophyte at T12-L1 causing mild spinal canal stenosis.\par 3.  Multilevel disc and facet joint disease.\par \par L1-L2\par Disc bulging with central posterior disc protrusion. Mild thecal sac compression. Bilateral intrathecal L2 nerve root abutment.\par \par L2-L3\par Mild disc bulging. Mild thecal sac compression. Bilateral intrathecal L3 nerve root abutment.\par \par L3-L4\par Circumferential disc bulging with disc osteophyte. Moderate bilateral facet joint and ligamentum flavum hypertrophy. Mild thecal sac compression. Bilateral intrathecal L4 nerve root abutment.\par \par L4-L5\par Circumferential disc bulging. Severe bilateral facet joint arthrosis and hypertrophy. Mild thecal sac compression with mild left intrathecal L5 nerve root posterior displacement and right intrathecal L5 nerve root abutment.\par \par L5-S1\par Circumferential disc bulging worse on the left. Severe right and mild to moderate left facet joint arthrosis. Mild thecal sac compression. Abutment of the left intrathecal S1 nerve root. Mild to moderate left foraminal stenosis.\par

## 2023-02-04 NOTE — HISTORY OF PRESENT ILLNESS
[de-identified] : 67 year old female followup with acute exacerbation of chronic low back pain.  She denies injury.  She denies radicular pain, recent illness, fevers, numbness, weakness, balance problems, saddle anesthesia, urinary retention or fecal incontinence. Since her last visit her pain has continued. She is doing PT with some relief.  She is here to review her lumbar MRI.

## 2023-02-04 NOTE — HISTORY OF PRESENT ILLNESS
[de-identified] : 67 year old female followup with acute exacerbation of chronic low back pain.  She denies injury.  She denies radicular pain, recent illness, fevers, numbness, weakness, balance problems, saddle anesthesia, urinary retention or fecal incontinence. Since her last visit she had a lumbar epidural which provided relief.  She takes cyclobenzaprine which helps.

## 2023-02-04 NOTE — ASSESSMENT
[FreeTextEntry1] : 67 year old female followup with acute exacerbation of chronic low back pain.  She denies injury.  She denies radicular pain.  She is neurologically intact. Since her last visit her pain has continued. We reviewed her lumbar MRI which shows degenerative changes. We discussed treatment options including physical therapy, medications, spinal injections and surgery. The patient was given a referral for consideration for spinal injections. She can tale tizanidine.  She can continue PT.  She will followup in 4-6 weeks. We discussed red flag symptoms that would require emergent evaluation. She knows to call with any questions or concerns or if her symptoms acutely worsen.

## 2023-02-07 ENCOUNTER — APPOINTMENT (OUTPATIENT)
Dept: PHYSICAL MEDICINE AND REHAB | Facility: CLINIC | Age: 68
End: 2023-02-07
Payer: MEDICARE

## 2023-02-07 DIAGNOSIS — M51.26 OTHER INTERVERTEBRAL DISC DISPLACEMENT, LUMBAR REGION: ICD-10-CM

## 2023-02-07 PROCEDURE — 99213 OFFICE O/P EST LOW 20 MIN: CPT

## 2023-02-07 NOTE — ASSESSMENT
[FreeTextEntry1] : MRI reviewed showing L2-3 disc bulge with L3 abutment, L3-4 ligamentum flavum hypertrophy with L4 abutment, L4/5 disc bulge with L5 abutment, L5/S1 disc bulge with S1 abutment, diffuse facet arthritis.\par \par Discussed diagnosis and treatment plan including PT.\par Discussed mbb and rfa since facet injections did not last.\par \par

## 2023-02-07 NOTE — HISTORY OF PRESENT ILLNESS
[FreeTextEntry1] : Location: left back\par Severity: 8/10\par Duration: years\par Aggravating Factors: most of the time\par Alleviating Factors: sitting on couch\par Associated Symptoms: denies weight loss, fever, chills, change in bowel/bladder habits, weakness, numbness/tingling, radiation down legs\par Prior Studies: MRI \par 1/2023 left L4/5 and L5/S1 facet injections - over 60% relief for 2 wk

## 2023-02-07 NOTE — PHYSICAL EXAM
[FreeTextEntry1] : RACHAEL is a 67 year old female \par Constitutional: healthy appearing, NAD, and normal body habitus\par \par LUMBAR\par ROM: flexion to 30 deg, ext to 5 deg\par \par Gait: normal\par \par Inspection: no erythema, warmth\par Spine: no TTP in spinous process\par Bony palpation: no TTP in GT\par \par Soft tissue palpation hip: no TTP in gluteus maame\par Soft tissue palpation of spine: left TTP in lumbar paraspinals\par \par 5/5 bilateral KE, DF, PF \par sensation intact in bilat LE

## 2023-02-08 ENCOUNTER — APPOINTMENT (OUTPATIENT)
Dept: ORTHOPEDIC SURGERY | Facility: CLINIC | Age: 68
End: 2023-02-08
Payer: MEDICARE

## 2023-02-08 DIAGNOSIS — M75.111 INCOMPLETE ROTATOR CUFF TEAR OR RUPTURE OF RIGHT SHOULDER, NOT SPECIFIED AS TRAUMATIC: ICD-10-CM

## 2023-02-08 PROCEDURE — 99213 OFFICE O/P EST LOW 20 MIN: CPT

## 2023-02-08 RX ORDER — OXYCODONE AND ACETAMINOPHEN 7.5; 325 MG/1; MG/1
7.5-325 TABLET ORAL
Qty: 42 | Refills: 0 | Status: ACTIVE | COMMUNITY
Start: 2023-02-08 | End: 1900-01-01

## 2023-02-08 RX ORDER — ONDANSETRON 8 MG/1
8 TABLET, ORALLY DISINTEGRATING ORAL 3 TIMES DAILY
Qty: 15 | Refills: 0 | Status: ACTIVE | COMMUNITY
Start: 2023-02-08 | End: 1900-01-01

## 2023-02-08 NOTE — HISTORY OF PRESENT ILLNESS
[de-identified] : LOCATION:RIGHT SHOULDER PAIN- [facility] \par FOLLOW UP \par PAIN LEVEL:  7/10 \par ICE AND MEDICATION AS NEEDED\par FEBRUARY14, 2023- RIGHT  ROT CUFF REPAIR PARTIAL , CALLIE, DEBRIDEMENT, SYNOVECTOMY\par \par \par \par \par \par LOCATION:RIGHT SHOULDER PAIN- [facility] \par DURATION: 4-5 MONTHS AGO \par CONTEXT: ATRAUMATIC- NO SPECIFIC INJURY \par QUALITY:SHARP, ACHY \par  INTERMITTENT \par PAIN LEVEL: 7-8/10 \par BETTER WITH RESTING, IBUPROFEN \par WORSE WITH REACHING BEHIND, AT NIGHT, OVER HEAD LIFTING \par \par LEFT SHOULDER- ROT CUFF SURGERY- 2009\par

## 2023-02-08 NOTE — PHYSICAL EXAM
[de-identified] : PHYSICAL EXAM  RIGHT  SHOULDER\par \par MODERATE  SCAPULAR PROTRACTION\par AROM 120 / 130 / 80 / 20 \par TENDER: SA REGION ANTERIOR AND LATERAL\par \par SPECIAL TESTING :\par ARROYO - POSITIVE \par MILE - POSITIVE \par SPEED TEST - POSITIVE\par \par YEBOAH - NEGATIVE \par APPREHENSION AND SUPPRESSION - NEGATIVE \par \par RC STRENGTH TESTING \par SS:  5/5\par SUB 5/5\par IS     5/5\par BICEPS  5/5\par \par SENSATION  - GROSSLY INTACT\par \par \par

## 2023-02-08 NOTE — DISCUSSION/SUMMARY
[de-identified] : PREOP SHOULDER SURGERY DISCUSSION:\par \par PROCEDURE DISCUSSED - QUESTIONS ANSWERED\par PATIENT WISHES TO PROCEED\par \par POST OP CARE AND LIMITATIONS REVIEWED - HANDOUT PROVIDED \par \par COLD PACKS RECOMMENDED\par ANALGESICS AND  ANTI NAUSEA MEDS PRESCRIBED \par COLACE RECOMMENDED\par \par \par THERE ARE NO GUARANTEES THAT ALL SYMPTOMS WILL BE ALLEVIATED  \par SHOULDER ARTHROSCOPY, ACROMIOPLASTY, DEBRIDEMENT,  RC REPAIR AND LABRUM REPAIRS- ON AVERAGE 75- 85% SATISFACTORY RESULTS FOR TEARS < 3CM AFTER 9-12 MONTHS HEALING AND REHABILITATION. \par \par REPAIRS WILL REQUIRE STRICT SHOULDER IMMOBILIZER 4-6 WEEKS\par \par RC TEARS 3CM OR LARGER MAY REQUIRE COLLAGEN PATCH AUGMENTATION GENERALLY HAVE LESS SATISFACTORY RESULTS\par \par PHYSICAL THERAPY REQUIRED 2X WEEK FOR  MINIMUM 8-12 WEEKS FOR ALL PROCEDURES \par CONTINUED HOME EXERCISES 6-9 MONTHS AFTER THAT REQUIRED FOR OPTIMAL OUTCOMES \par \par ROUTINE SURGICAL AND ANESTHETIC RISKS INCLUDE RISK OF SURGICAL INFECTION, ANESTHETIC COMPLICATION OR ALLERGY, POSSIBLE RETEARS OR PROGRESSION OF TEAR, STIFFNESS OF SHOULDER AND UNSATISFACTORY OUTCOMES\par \par PATIENT UNDERSTANDS AND WISHES TO PROCEED\par

## 2023-02-13 ENCOUNTER — APPOINTMENT (OUTPATIENT)
Dept: PHYSICAL MEDICINE AND REHAB | Facility: CLINIC | Age: 68
End: 2023-02-13
Payer: MEDICARE

## 2023-02-13 PROCEDURE — 64494 INJ PARAVERT F JNT L/S 2 LEV: CPT | Mod: LT

## 2023-02-13 PROCEDURE — 64493 INJ PARAVERT F JNT L/S 1 LEV: CPT | Mod: LT

## 2023-02-13 NOTE — PROCEDURE
[de-identified] : indication: pain\par \par Fluoroscopically Guided Left L3, L4, L5 Medial Branch Block\par \par \par \par After informed consent, the patient was placed prone on the fluoroscopy table. Skin over the area was prepped and draped in the usual sterile fashion before being anesthetized with 1% Lidocaine. Then using an oblique approach, a 5 in spinal needle was directed down to the junction of the LEFT   lateral superior articular process and the sacral ala. Needle placement was confirmed with AP fluoroscopic images. After negative aspiration for blood or cerebrospinal fluid, 1 ml of  2% Lidocaine was instilled.  The same procedure was repeated by placing the needle at the junction of the LEFT L4 SAP and L5 SAP and their corresponding transverse processes.  Following the procedure, the needles were removed, the skin was cleaned, and sterile dressings were applied.  She tolerated the procedure well and was discharged in good condition without any complications or complaints.  She  felt immediate pain relief.  She  was given discharge instructions and asked to follow-up in clinic in 1 week.\par \par Exp 7/2023\par Manufacture: [employer]\par NDC [id number]\par LOT [id number]\par

## 2023-02-14 ENCOUNTER — APPOINTMENT (OUTPATIENT)
Dept: ORTHOPEDIC SURGERY | Facility: HOSPITAL | Age: 68
End: 2023-02-14
Payer: MEDICARE

## 2023-02-14 PROCEDURE — 29826 SHO ARTHRS SRG DECOMPRESSION: CPT | Mod: RT,59

## 2023-02-14 PROCEDURE — 29823 SHO ARTHRS SRG XTNSV DBRDMT: CPT | Mod: RT,59

## 2023-02-14 PROCEDURE — 29827 SHO ARTHRS SRG RT8TR CUF RPR: CPT | Mod: RT

## 2023-02-14 PROCEDURE — 29820 SHO ARTHRS SRG PRTL SYNVCT: CPT | Mod: RT,59

## 2023-02-17 ENCOUNTER — APPOINTMENT (OUTPATIENT)
Dept: ORTHOPEDIC SURGERY | Facility: CLINIC | Age: 68
End: 2023-02-17
Payer: MEDICARE

## 2023-02-17 DIAGNOSIS — M85.80 OTHER SPECIFIED DISORDERS OF BONE DENSITY AND STRUCTURE, UNSPECIFIED SITE: ICD-10-CM

## 2023-02-17 PROCEDURE — 73030 X-RAY EXAM OF SHOULDER: CPT | Mod: LT

## 2023-02-17 PROCEDURE — 99024 POSTOP FOLLOW-UP VISIT: CPT

## 2023-02-17 NOTE — DISCUSSION/SUMMARY
[de-identified] : POST OP REPAIR:\par FEBRUARY 14, 2023- RIGHT 8mm  ROT CUFF REPAIR, CALLIE, DEBRIDEMEN\par \par COLD THERAPY,ANALGESICS AS NEEDED\par \par SHOULDER IMMOBILIZED FULL TIME X 3 - 6 WEEKS - STRICT NO AROM - DESKTOP WORK ALLOWED\par \par SCAPULAR SQUEEZES / ROLLS, ELBOW, WRIST, HAND AROM TID \par \par START PENDULUM EXERCISES, EXT ROT  1 WEEK POSTOP\par \par START AAROM FLEXION, PULLEY  3 WEEKS POST OP\par \par 4-6 WEEKS POSTOP  - ADVANCE TO P.T.\par

## 2023-02-17 NOTE — HISTORY OF PRESENT ILLNESS
[de-identified] : LOCATION:RIGHT SHOULDER PAIN- [facility] \par FOLLOW UP \par PAIN LEVEL:  7/10 \par FEBRUARY 14, 2023- RIGHT 8mm  ROT CUFF REPAIR, CALLIE, DEBRIDEMENT\par \par \par \par \par \par LOCATION:RIGHT SHOULDER PAIN- [facility] \par DURATION: 4-5 MONTHS AGO \par CONTEXT: ATRAUMATIC- NO SPECIFIC INJURY \par QUALITY:SHARP, ACHY \par  INTERMITTENT \par PAIN LEVEL: 7-8/10 \par BETTER WITH RESTING, IBUPROFEN \par WORSE WITH REACHING BEHIND, AT NIGHT, OVER HEAD LIFTING \par \par LEFT SHOULDER- ROT CUFF SURGERY- 2009\par

## 2023-02-17 NOTE — PHYSICAL EXAM
[de-identified] : FEBRUARY 14, 2023- RIGHT 8mm  ROT CUFF REPAIR, CALLIE, DEBRIDEMENT\par \par POST OP SHOULDER EXAM \par \par PORTALS HEALING WELL - NO ERYTHEMA OR CALOR\par SUTURES REMOVED, STERISTRIPS AND WATERPROOF BANDAIDS  APPLIED \par \par AROM  PENDULUM\par \par DISTAL CMS INTACT\par  [de-identified] : LEFT SHOULDER XRAY (2 VIEWS - AP AND OUTLET)  -  \par NO OBVIOUS FRACTURE OR DISLOCATION, \par SATISFACTORY DECOMPRESSION NOTED  , \par ANCHOR SILHOUETTE VISIBLE IN GREATER TUBEROSITY- SATISFACTORY POSITION\par

## 2023-03-08 ENCOUNTER — APPOINTMENT (OUTPATIENT)
Dept: ORTHOPEDIC SURGERY | Facility: CLINIC | Age: 68
End: 2023-03-08
Payer: MEDICARE

## 2023-03-08 DIAGNOSIS — S46.001A UNSPECIFIED INJURY OF MUSCLE(S) AND TENDON(S) OF THE ROTATOR CUFF OF RIGHT SHOULDER, INITIAL ENCOUNTER: ICD-10-CM

## 2023-03-08 PROCEDURE — 99024 POSTOP FOLLOW-UP VISIT: CPT

## 2023-03-08 NOTE — DISCUSSION/SUMMARY
[de-identified] : POST OP REPAIR:\par FEBRUARY 14, 2023- RIGHT 8mm ROT CUFF REPAIR, CALLIE, DEBRIDEMEN\par \par COLD THERAPY,ANALGESICS AS NEEDED\par \par SHOULDER IMMOBILIZED FULL TIME X 3 - 6 WEEKS - STRICT NO AROM - DESKTOP WORK ALLOWED\par \par SCAPULAR SQUEEZES / ROLLS, ELBOW, WRIST, HAND AROM TID \par \par START PENDULUM EXERCISES, EXT ROT 1 WEEK POSTOP\par \par START AAROM FLEXION, PULLEY 3 WEEKS POST OP\par \par 4-6 WEEKS POSTOP - ADVANCE TO P.T.

## 2023-03-08 NOTE — PHYSICAL EXAM
[de-identified] : PHYSICAL EXAM SHOULDER POST OP 2\par \par PORTALS / INCISIONS HEALING - \par NO ERYTHEMA OR CALOR \par \par AAROIM 90\par RC NT\par

## 2023-03-08 NOTE — HISTORY OF PRESENT ILLNESS
[de-identified] : RIGHT SHOULDER \par FOLLOW UP-POST OP # 3\par PAIN LEVEL:4/10\par FEBRUARY 14, 2023- RIGHT 8mm ROT CUFF REPAIR, CALLIE, DEBRIDEMENT\par \par \par \par \par PREVIOUS HPI \par LOCATION:RIGHT SHOULDER PAIN- [facility] \par FOLLOW UP \par PAIN LEVEL:  7/10 \par FEBRUARY 14, 2023- RIGHT 8mm  ROT CUFF REPAIR, CALLIE, DEBRIDEMENT\par \par \par \par \par \par LOCATION:RIGHT SHOULDER PAIN- [facility] \par DURATION: 4-5 MONTHS AGO \par CONTEXT: ATRAUMATIC- NO SPECIFIC INJURY \par QUALITY:SHARP, ACHY \par  INTERMITTENT \par PAIN LEVEL: 7-8/10 \par BETTER WITH RESTING, IBUPROFEN \par WORSE WITH REACHING BEHIND, AT NIGHT, OVER HEAD LIFTING \par \par LEFT SHOULDER- ROT CUFF SURGERY- 2009\par

## 2023-03-09 ENCOUNTER — APPOINTMENT (OUTPATIENT)
Dept: ORTHOPEDIC SURGERY | Facility: CLINIC | Age: 68
End: 2023-03-09

## 2023-03-28 RX ORDER — HYALURONATE SODIUM, STABILIZED 88 MG/4 ML
88 SYRINGE (ML) INTRAARTICULAR
Qty: 2 | Refills: 0 | Status: ACTIVE | OUTPATIENT
Start: 2023-03-28

## 2023-04-07 ENCOUNTER — APPOINTMENT (OUTPATIENT)
Dept: ORTHOPEDIC SURGERY | Facility: CLINIC | Age: 68
End: 2023-04-07
Payer: MEDICARE

## 2023-04-07 DIAGNOSIS — G89.29 DORSALGIA, UNSPECIFIED: ICD-10-CM

## 2023-04-07 DIAGNOSIS — M54.9 DORSALGIA, UNSPECIFIED: ICD-10-CM

## 2023-04-07 DIAGNOSIS — M41.9 SCOLIOSIS, UNSPECIFIED: ICD-10-CM

## 2023-04-07 PROCEDURE — 99214 OFFICE O/P EST MOD 30 MIN: CPT | Mod: 24

## 2023-04-07 RX ORDER — TIZANIDINE 2 MG/1
2 TABLET ORAL
Qty: 40 | Refills: 0 | Status: ACTIVE | COMMUNITY
Start: 2023-04-07 | End: 1900-01-01

## 2023-04-21 ENCOUNTER — APPOINTMENT (OUTPATIENT)
Dept: PHYSICAL MEDICINE AND REHAB | Facility: CLINIC | Age: 68
End: 2023-04-21
Payer: MEDICARE

## 2023-04-21 PROCEDURE — 99213 OFFICE O/P EST LOW 20 MIN: CPT

## 2023-04-21 NOTE — ASSESSMENT
[FreeTextEntry1] : MRI reviewed showing L2-3 disc bulge with L3 abutment, L3-4 ligamentum flavum hypertrophy with L4 abutment, L4/5 disc bulge with L5 abutment, L5/S1 disc bulge with S1 abutment, diffuse facet arthritis.\par \par Discussed diagnosis and treatment plan including PT.\par Discussed mbb and rfa since facet injections did not last.\par Already did PT and doing it for shoulder.  Learn dead bug online.  Taught PF.  Stretch hamstring with left arm. \par

## 2023-04-21 NOTE — PHYSICAL EXAM
[FreeTextEntry1] : RACHAEL is a 68 year old female \par Constitutional: healthy appearing, NAD, and normal body habitus\par \par LUMBAR\par ROM: flexion to 30 deg, ext to 5 deg\par \par Gait: normal\par \par Inspection: no erythema, warmth\par Spine: no TTP in spinous process\par Bony palpation: no TTP in GT\par \par Soft tissue palpation hip: no TTP in gluteus maame\par Soft tissue palpation of spine: left TTP in lumbar paraspinals\par \par 5/5 bilateral KE, DF, PF \par sensation intact in bilat LE

## 2023-04-21 NOTE — HISTORY OF PRESENT ILLNESS
[FreeTextEntry1] : Location: left back\par Severity: 7/10\par Duration: years\par Aggravating Factors: most of the time\par Alleviating Factors: sitting on couch\par Associated Symptoms: denies weight loss, fever, chills, change in bowel/bladder habits, weakness, numbness/tingling, radiation down legs\par Prior Studies: MRI \par 1/2023 left L4/5 and L5/S1 facet injections - over 60% relief for 2 wk \par  2/2023 left L3, L4, L5 mbb - over 80% relief for 5 days

## 2023-05-03 ENCOUNTER — APPOINTMENT (OUTPATIENT)
Dept: ORTHOPEDIC SURGERY | Facility: CLINIC | Age: 68
End: 2023-05-03
Payer: MEDICARE

## 2023-05-03 PROCEDURE — 20611 DRAIN/INJ JOINT/BURSA W/US: CPT | Mod: 50,79

## 2023-05-03 PROCEDURE — 99213 OFFICE O/P EST LOW 20 MIN: CPT | Mod: 24,25

## 2023-05-03 NOTE — PHYSICAL EXAM
[de-identified] : PHYSICAL EXAM BILATERAL KNEES\par \par AROM\par RIGHT 0- 140\par LEFT 0-140 \par \par SPECIAL TESTS\par \par PATELLAR GRIND = GRIND \par DRAWER = NEG\par LACHMAN = NEG\par MACMURRAY = NEG \par \par MOTOR = GROSSLY INTACT\par SENSORY = GROSSLY INTACT

## 2023-05-03 NOTE — DISCUSSION/SUMMARY
[de-identified] : XRAYS REVEAL KNEE OSTEOARTHRITIS\par LITTLE RELIEF FROM NSAIDS , EXERCISES \par PATIENT HAS ELECTED TO PROCEED WITH VISCOSUPPLEMENT INJECTION KNEE \par RISKS AND BENEFITS DISCUSSED - VERBAL CONSENT OBTAINED \par SEE PROCEDURE NOTE\par \par \par POST INJECTION INSTRUCTIONS:\par \par INJECTION THERAPY HANDOUT PROVIDED\par \par COLD THERAPY , ANALGESICS PRN\par \par HOME STRETCHING AND EXERCISES QD - KNEE OA HANDOUT PROVIDED, REVIEWED AND DEMONSTRATED \par \par REPEAT MONOVISC 6 MONTHS

## 2023-05-03 NOTE — PROCEDURE
[de-identified] : Patient has demonstrated limited relief from NSAIDS, rest, exercises / PT , and after discussion of the risks and benefits, the patient has elected to proceed with a\par n ULTRASOUND GUIDED VISCOSUPPLEMENT injection into the BILATERAL SupeLat PF Knee\par  \par Confirmed that the patient does not have history of prior adverse reactions, active, infections, or relevant allergies. There was no effusion, erythema, or warmth, and the skin was clear\par \par The skin was sterilized with alcohol. Ethyl Chloride was used as a topical anesthetic. Routine sterile technique. \par  \par The KNEE JOINT was injected utilizing ULTRASOUND GUIDANCE with MONOVISC 4CC. The injection was completed without complication and a bandage was applied.\par \par The patient tolerated the procedure well and was given post-injection instructions.Rec: Cold therapy, analgesics, avoid heavy activity.\par \par MEDICATION: MONOVISC 4CC\par \par EFFUSION ASPIRATED : NONE

## 2023-05-03 NOTE — HISTORY OF PRESENT ILLNESS
DISPLAY PLAN FREE TEXT [de-identified] : BILATERAL KNEE PAIN \par FOLLOW UP\par PAIN LEVEL: 2/10\par MONOVISC INJECTION-10/6/2023-HELPFUL \par MONOVISC INJECTION TODAY- PROVIDED BY HOUSE SUPPLY \par \par \par PREVIOUS HPI\par BILATERAL KNEE PAIN\par STARTED 2 YEARS AGO- NO SPECIFIC INJURY \par INTERMITTENT \par WORSE WHEN GOING UP/DOWN STAIRS, BENDING \par BETTER WITH IBUPROFEN, REST \par PAIN LEVEL: 7/10\par ACHY

## 2023-05-07 PROBLEM — M41.9 SCOLIOSIS OF THORACOLUMBAR SPINE: Status: ACTIVE | Noted: 2022-10-12

## 2023-05-07 PROBLEM — M54.9 CHRONIC BACK PAIN: Status: ACTIVE | Noted: 2022-10-12

## 2023-05-07 NOTE — ASSESSMENT
[FreeTextEntry1] : 68 year old female followup with acute exacerbation of chronic low back pain.  She denies injury.  She denies radicular pain.  She is neurologically intact. She reports improvement in her pain after spinal injections (facet and MBB).  She has been doing PT with improvement.  She can consider additional spinal injections. She can tale tizanidine.  She can continue PT.  She will followup in 4-6 weeks. We discussed red flag symptoms that would require emergent evaluation. She knows to call with any questions or concerns or if her symptoms acutely worsen.

## 2023-05-07 NOTE — HISTORY OF PRESENT ILLNESS
[de-identified] : 68 year old female followup with acute exacerbation of chronic low back pain.  She denies injury.  She denies radicular pain, recent illness, fevers, numbness, weakness, balance problems, saddle anesthesia, urinary retention or fecal incontinence.  She reports improvement in her pain after spinal injections (facet and MBB).  She has been doing PT with improvement.

## 2023-05-07 NOTE — PHYSICAL EXAM
[de-identified] : General: No acute distress, conversant, well-nourished.\par Head: Normocephalic, atraumatic\par Neck: trachea midline, FROM\par Heart: normotensive and normal rate and rhythm\par Lungs: No labored breathing\par Skin: No abrasions, no rashes, no edema\par Psych: Alert and oriented to person, place and time\par Extremities: no peripheral edema or digital cyanosis\par Gait: Normal gait. Can perform tandem gait.  \par Vascular: warm and well perfused distally, palpable distal pulses\par \par MSK:\par Lumbar spine:\par No tenderness to palpation.  No step-off, no deformity.\par \par NEURO EXAM:\par Sensation \par Left L2  -  2/2            \par Left L3  -  2/2\par Left L4  -  2/2\par Left L5  -  2/2\par Left S1  -  2/2\par \par Right L2  -  2/2            \par Right L3  -  2/2\par Right L4  -  2/2\par Right L5  -  2/2\par Right S1  -  2/2\par \par Motor: \par Left L2 (hip flexion)                            5/5                \par Left L3 (knee extension)                   5/5                \par Left L4 (ankle dorsiflexion)                 5/5                \par Left L5 (long toe extensor)                5/5                \par Left S1 (ankle plantar flexion)           5/5\par \par Right L2 (hip flexion)                            5/5                \par Right L3 (knee extension)                   5/5                \par Right L4 (ankle dorsiflexion)                 5/5                \par Right L5 (long toe extensor)                5/5                \par Right S1 (ankle plantar flexion)           5/5\par \par Reflexes: Normal and symmetric\par Negative clonus.  Down-going Babinski.   [de-identified] : MRI of the lumbar spine demonstrates: (12/08/22) \par 1.  Grade 1 anterolisthesis L1-L2.\par 2.  Circumferential disc bulging and disc osteophyte at T12-L1 causing mild spinal canal stenosis.\par 3.  Multilevel disc and facet joint disease.\par \par L1-L2\par Disc bulging with central posterior disc protrusion. Mild thecal sac compression. Bilateral intrathecal L2 nerve root abutment.\par \par L2-L3\par Mild disc bulging. Mild thecal sac compression. Bilateral intrathecal L3 nerve root abutment.\par \par L3-L4\par Circumferential disc bulging with disc osteophyte. Moderate bilateral facet joint and ligamentum flavum hypertrophy. Mild thecal sac compression. Bilateral intrathecal L4 nerve root abutment.\par \par L4-L5\par Circumferential disc bulging. Severe bilateral facet joint arthrosis and hypertrophy. Mild thecal sac compression with mild left intrathecal L5 nerve root posterior displacement and right intrathecal L5 nerve root abutment.\par \par L5-S1\par Circumferential disc bulging worse on the left. Severe right and mild to moderate left facet joint arthrosis. Mild thecal sac compression. Abutment of the left intrathecal S1 nerve root. Mild to moderate left foraminal stenosis.\par

## 2023-05-15 ENCOUNTER — APPOINTMENT (OUTPATIENT)
Dept: PHYSICAL MEDICINE AND REHAB | Facility: CLINIC | Age: 68
End: 2023-05-15
Payer: MEDICARE

## 2023-05-15 DIAGNOSIS — M47.816 SPONDYLOSIS W/OUT MYELOPATHY OR RADICULOPATHY, LUMBAR REGION: ICD-10-CM

## 2023-05-15 PROCEDURE — 64494 INJ PARAVERT F JNT L/S 2 LEV: CPT | Mod: LT

## 2023-05-15 PROCEDURE — 64493 INJ PARAVERT F JNT L/S 1 LEV: CPT | Mod: LT

## 2023-05-15 RX ORDER — ACETAMINOPHEN AND CODEINE PHOSPHATE 300; 30 MG/1; MG/1
300-30 TABLET ORAL
Qty: 20 | Refills: 0 | Status: DISCONTINUED | COMMUNITY
Start: 2022-12-13

## 2023-05-15 RX ORDER — SERTRALINE HYDROCHLORIDE 100 MG/1
100 TABLET, FILM COATED ORAL
Qty: 30 | Refills: 0 | Status: ACTIVE | COMMUNITY
Start: 2022-11-01

## 2023-05-15 RX ORDER — FAMOTIDINE 40 MG/1
40 TABLET, FILM COATED ORAL
Qty: 30 | Refills: 0 | Status: ACTIVE | COMMUNITY
Start: 2023-01-10

## 2023-05-15 RX ORDER — AMOXICILLIN 500 MG/1
500 CAPSULE ORAL
Qty: 21 | Refills: 0 | Status: DISCONTINUED | COMMUNITY
Start: 2022-12-13

## 2023-05-15 RX ORDER — METHIMAZOLE 10 MG/1
10 TABLET ORAL
Qty: 30 | Refills: 0 | Status: ACTIVE | COMMUNITY
Start: 2022-12-07

## 2023-05-15 RX ORDER — OMEPRAZOLE 40 MG/1
40 CAPSULE, DELAYED RELEASE ORAL
Qty: 30 | Refills: 0 | Status: ACTIVE | COMMUNITY
Start: 2022-12-08

## 2023-05-15 RX ORDER — FLUOROURACIL 50 MG/G
5 CREAM TOPICAL
Qty: 40 | Refills: 0 | Status: ACTIVE | COMMUNITY
Start: 2022-12-08

## 2023-05-15 RX ORDER — COVID-19 MOLECULAR TEST ASSAY
KIT MISCELLANEOUS
Qty: 4 | Refills: 0 | Status: ACTIVE | COMMUNITY
Start: 2022-11-15

## 2023-05-15 RX ORDER — DIAZEPAM 10 MG/1
10 TABLET ORAL
Qty: 30 | Refills: 0 | Status: ACTIVE | COMMUNITY
Start: 2023-02-03

## 2023-05-15 RX ORDER — DIVALPROEX SODIUM 500 MG/1
500 TABLET, DELAYED RELEASE ORAL
Qty: 30 | Refills: 0 | Status: ACTIVE | COMMUNITY
Start: 2022-12-26

## 2023-05-15 NOTE — PROCEDURE
[de-identified] : indication: pain\par \par Fluoroscopically Guided Left L3, L4, L5 Medial Branch Block\par \par \par After informed consent, the patient was placed prone on the fluoroscopy table. Skin over the area was prepped and draped in the usual sterile fashion before being anesthetized with 1% Lidocaine. Then using an oblique approach, a 5 in spinal needle was directed down to the junction of the LEFT   lateral superior articular process and the sacral ala. Needle placement was confirmed with AP fluoroscopic images. After negative aspiration for blood or cerebrospinal fluid, 1 ml of  2% Lidocaine was instilled.  The same procedure was repeated by placing the needle at the junction of the LEFT L4 SAP and L5 SAP and their corresponding transverse processes.  Following the procedure, the needles were removed, the skin was cleaned, and sterile dressings were applied.  She tolerated the procedure well and was discharged in good condition without any complications or complaints.  She  felt immediate pain relief.  She  was given discharge instructions and asked to follow-up in clinic in 1 week.\par \par Exp 7/2023\par Manufacture: [employer]\par NDC [id number]\par LOT [id number]\par

## 2023-05-23 ENCOUNTER — APPOINTMENT (OUTPATIENT)
Dept: ORTHOPEDIC SURGERY | Facility: CLINIC | Age: 68
End: 2023-05-23

## 2023-06-19 RX ORDER — TRAMADOL HYDROCHLORIDE 50 MG/1
50 TABLET, COATED ORAL
Qty: 60 | Refills: 0 | Status: ACTIVE | COMMUNITY
Start: 2023-05-03 | End: 1900-01-01

## 2023-06-28 DIAGNOSIS — M75.101 UNSPECIFIED ROTATOR CUFF TEAR OR RUPTURE OF RIGHT SHOULDER, NOT SPECIFIED AS TRAUMATIC: ICD-10-CM

## 2023-10-09 RX ORDER — HYALURONATE SODIUM, STABILIZED 88 MG/4 ML
88 SYRINGE (ML) INTRAARTICULAR
Qty: 2 | Refills: 0 | Status: ACTIVE | OUTPATIENT
Start: 2023-10-09

## 2023-11-15 ENCOUNTER — APPOINTMENT (OUTPATIENT)
Dept: ORTHOPEDIC SURGERY | Facility: CLINIC | Age: 68
End: 2023-11-15
Payer: MEDICARE

## 2023-11-15 PROCEDURE — 99213 OFFICE O/P EST LOW 20 MIN: CPT | Mod: 25

## 2023-11-15 PROCEDURE — 20611 DRAIN/INJ JOINT/BURSA W/US: CPT | Mod: 50

## 2024-04-12 RX ORDER — HYALURONATE SODIUM, STABILIZED 88 MG/4 ML
88 SYRINGE (ML) INTRAARTICULAR
Qty: 2 | Refills: 0 | Status: ACTIVE | OUTPATIENT
Start: 2024-04-12

## 2024-05-09 ENCOUNTER — APPOINTMENT (OUTPATIENT)
Dept: ORTHOPEDIC SURGERY | Facility: CLINIC | Age: 69
End: 2024-05-09
Payer: MEDICARE

## 2024-05-09 DIAGNOSIS — M17.0 BILATERAL PRIMARY OSTEOARTHRITIS OF KNEE: ICD-10-CM

## 2024-05-09 PROCEDURE — 20611 DRAIN/INJ JOINT/BURSA W/US: CPT | Mod: 50

## 2024-05-09 PROCEDURE — 99213 OFFICE O/P EST LOW 20 MIN: CPT | Mod: 25

## 2024-05-09 NOTE — HISTORY OF PRESENT ILLNESS
[de-identified] : BILATERAL KNEE PAIN  FOLLOW UP PAIN LEVEL: 2/10 MONOVISC INJECTION-11/15/23--HELPFUL  MONOVISC INJECTION-10/6/2023-HELPFUL  MONOVISC INJECTION-05/03/2023-HELPFUL  MONOVISC INJECTION TODAY- PROVIDED BY HOUSE SUPPLY       PREVIOUS HPI BILATERAL KNEE PAIN STARTED 2 YEARS AGO- NO SPECIFIC INJURY  INTERMITTENT  WORSE WHEN GOING UP/DOWN STAIRS, BENDING  BETTER WITH IBUPROFEN, REST  PAIN LEVEL: 7/10 ACHY

## 2024-05-09 NOTE — PHYSICAL EXAM
[de-identified] : PHYSICAL EXAM BILATERAL KNEES  AROM RIGHT 0- 140 LEFT 0-140   SPECIAL TESTS  PATELLAR GRIND = GRIND  DRAWER = NEG LACHMAN = NEG MACMURRAY = NEG   MOTOR = GROSSLY INTACT SENSORY = GROSSLY INTACT

## 2024-05-09 NOTE — PROCEDURE
[de-identified] : Patient has demonstrated limited relief from NSAIDS, rest, exercises / PT , and after discussion of the risks and benefits, the patient has elected to proceed with a n ULTRASOUND GUIDED VISCOSUPPLEMENT injection into the BILATERAL SupeLat PF Knee   Confirmed that the patient does not have history of prior adverse reactions, active, infections, or relevant allergies. There was no effusion, erythema, or warmth, and the skin was clear  The skin was sterilized with alcohol. Ethyl Chloride was used as a topical anesthetic. Routine sterile technique.    The KNEE JOINT was injected utilizing ULTRASOUND GUIDANCE with MONOVISC 4CC. The injection was completed without complication and a bandage was applied.  The patient tolerated the procedure well and was given post-injection instructions.Rec: Cold therapy, analgesics, avoid heavy activity.  MEDICATION: MONOVISC 4CC  EFFUSION ASPIRATED : NONE

## 2024-06-28 DIAGNOSIS — Z13.6 ENCOUNTER FOR SCREENING FOR CARDIOVASCULAR DISORDERS: ICD-10-CM

## 2024-08-08 ENCOUNTER — RESULT REVIEW (OUTPATIENT)
Age: 69
End: 2024-08-08

## 2024-08-08 ENCOUNTER — OUTPATIENT (OUTPATIENT)
Dept: OUTPATIENT SERVICES | Facility: HOSPITAL | Age: 69
LOS: 1 days | End: 2024-08-08
Payer: COMMERCIAL

## 2024-08-08 DIAGNOSIS — Z98.890 OTHER SPECIFIED POSTPROCEDURAL STATES: Chronic | ICD-10-CM

## 2024-08-08 DIAGNOSIS — Z98.41 CATARACT EXTRACTION STATUS, RIGHT EYE: Chronic | ICD-10-CM

## 2024-08-08 DIAGNOSIS — Z98.84 BARIATRIC SURGERY STATUS: Chronic | ICD-10-CM

## 2024-08-08 DIAGNOSIS — Z90.89 ACQUIRED ABSENCE OF OTHER ORGANS: Chronic | ICD-10-CM

## 2024-08-08 DIAGNOSIS — Z13.6 ENCOUNTER FOR SCREENING FOR CARDIOVASCULAR DISORDERS: ICD-10-CM

## 2024-08-08 DIAGNOSIS — Z00.8 ENCOUNTER FOR OTHER GENERAL EXAMINATION: ICD-10-CM

## 2024-08-08 DIAGNOSIS — R91.8 OTHER NONSPECIFIC ABNORMAL FINDING OF LUNG FIELD: ICD-10-CM

## 2024-08-08 DIAGNOSIS — S62.101A FRACTURE OF UNSPECIFIED CARPAL BONE, RIGHT WRIST, INITIAL ENCOUNTER FOR CLOSED FRACTURE: Chronic | ICD-10-CM

## 2024-08-08 PROCEDURE — 71250 CT THORAX DX C-: CPT

## 2024-08-08 PROCEDURE — 75571 CT HRT W/O DYE W/CA TEST: CPT | Mod: 26,NC

## 2024-08-08 PROCEDURE — 71250 CT THORAX DX C-: CPT | Mod: 26,NC

## 2024-08-08 PROCEDURE — 75571 CT HRT W/O DYE W/CA TEST: CPT

## 2024-08-09 DIAGNOSIS — Z13.6 ENCOUNTER FOR SCREENING FOR CARDIOVASCULAR DISORDERS: ICD-10-CM

## 2024-08-09 DIAGNOSIS — R91.8 OTHER NONSPECIFIC ABNORMAL FINDING OF LUNG FIELD: ICD-10-CM

## 2025-03-24 ENCOUNTER — APPOINTMENT (OUTPATIENT)
Dept: ORTHOPEDIC SURGERY | Facility: CLINIC | Age: 70
End: 2025-03-24
Payer: MEDICARE

## 2025-03-24 DIAGNOSIS — M54.2 CERVICALGIA: ICD-10-CM

## 2025-03-24 DIAGNOSIS — M75.111 INCOMPLETE ROTATOR CUFF TEAR OR RUPTURE OF RIGHT SHOULDER, NOT SPECIFIED AS TRAUMATIC: ICD-10-CM

## 2025-03-24 PROCEDURE — 99214 OFFICE O/P EST MOD 30 MIN: CPT | Mod: 25

## 2025-03-24 PROCEDURE — 20611 DRAIN/INJ JOINT/BURSA W/US: CPT

## 2025-03-24 PROCEDURE — 20552 NJX 1/MLT TRIGGER POINT 1/2: CPT | Mod: 59

## 2025-04-24 ENCOUNTER — APPOINTMENT (OUTPATIENT)
Dept: ORTHOPEDIC SURGERY | Facility: CLINIC | Age: 70
End: 2025-04-24

## 2025-04-24 DIAGNOSIS — M17.0 BILATERAL PRIMARY OSTEOARTHRITIS OF KNEE: ICD-10-CM

## 2025-04-24 PROCEDURE — 004KIT: CUSTOM

## 2025-04-24 PROCEDURE — 0232T NJX PLATELET PLASMA: CPT

## 2025-04-24 RX ORDER — HYDROCODONE BITARTRATE AND ACETAMINOPHEN 5; 325 MG/1; MG/1
5-325 TABLET ORAL
Qty: 30 | Refills: 0 | Status: ACTIVE | COMMUNITY
Start: 2025-04-24 | End: 1900-01-01

## (undated) DEVICE — WARMING BLANKET LOWER ADULT

## (undated) DEVICE — SPECIMEN CONTAINER 4OZ

## (undated) DEVICE — DRAPE MICROSCOPE KNOB COVER SMALL (2 PCS)

## (undated) DEVICE — SUT NYLON 10-0 12" CU-5

## (undated) DEVICE — PACK ANTERIOR SEGMENT

## (undated) DEVICE — PACK CENTURION 2.4MM

## (undated) DEVICE — Device

## (undated) DEVICE — CANNULA IRR ANT CHAMBER 30G

## (undated) DEVICE — KNIFE ALCON STANDARD FULL HANDLE 15 DEG (PINK)

## (undated) DEVICE — CANNULA FLEX TIP 45 DEG 27GAX22MM

## (undated) DEVICE — GLV 8 PROTEXIS (WHITE)

## (undated) DEVICE — VENODYNE/SCD SLEEVE CALF MEDIUM

## (undated) DEVICE — DRSG TAPE MICROPORE SURGICAL TAPE .5"X10 YDS TAN

## (undated) DEVICE — CATARACT KIT

## (undated) DEVICE — CANNULA ANT CHMBR 27GX22MM

## (undated) DEVICE — SOL IRR BAL SALT 500ML

## (undated) DEVICE — RUBBER BANDS STERILE

## (undated) DEVICE — NDL RETROBULBAR VISITEC 25X1.5

## (undated) DEVICE — NDL HYPO SAFE 25G X 5/8" (ORANGE)